# Patient Record
Sex: MALE | Race: WHITE | NOT HISPANIC OR LATINO | Employment: PART TIME | URBAN - METROPOLITAN AREA
[De-identification: names, ages, dates, MRNs, and addresses within clinical notes are randomized per-mention and may not be internally consistent; named-entity substitution may affect disease eponyms.]

---

## 2020-03-17 ENCOUNTER — TELEPHONE (OUTPATIENT)
Dept: FAMILY MEDICINE CLINIC | Facility: CLINIC | Age: 65
End: 2020-03-17

## 2020-03-27 ENCOUNTER — OFFICE VISIT (OUTPATIENT)
Dept: FAMILY MEDICINE CLINIC | Facility: CLINIC | Age: 65
End: 2020-03-27

## 2020-03-27 VITALS
WEIGHT: 260 LBS | BODY MASS INDEX: 34.46 KG/M2 | TEMPERATURE: 98 F | OXYGEN SATURATION: 96 % | DIASTOLIC BLOOD PRESSURE: 60 MMHG | HEART RATE: 67 BPM | SYSTOLIC BLOOD PRESSURE: 124 MMHG | HEIGHT: 73 IN

## 2020-03-27 DIAGNOSIS — I10 ESSENTIAL HYPERTENSION: ICD-10-CM

## 2020-03-27 DIAGNOSIS — E78.2 MIXED HYPERLIPIDEMIA: ICD-10-CM

## 2020-03-27 DIAGNOSIS — M79.10 MYALGIA: ICD-10-CM

## 2020-03-27 DIAGNOSIS — E11.9 TYPE 2 DIABETES MELLITUS WITHOUT COMPLICATION, WITHOUT LONG-TERM CURRENT USE OF INSULIN (HCC): Primary | ICD-10-CM

## 2020-03-27 DIAGNOSIS — M15.9 GENERALIZED OA: ICD-10-CM

## 2020-03-27 LAB
ALBUMIN SERPL-MCNC: 4.3 G/DL (ref 3.5–5.2)
ALBUMIN/GLOB SERPL: 1.7 G/DL
ALP SERPL-CCNC: 74 U/L (ref 39–117)
ALT SERPL-CCNC: 33 U/L (ref 1–41)
AST SERPL-CCNC: 24 U/L (ref 1–40)
BILIRUB SERPL-MCNC: 0.6 MG/DL (ref 0.2–1.2)
BUN SERPL-MCNC: 15 MG/DL (ref 8–23)
BUN/CREAT SERPL: 17.2 (ref 7–25)
CALCIUM SERPL-MCNC: 9.4 MG/DL (ref 8.6–10.5)
CHLORIDE SERPL-SCNC: 101 MMOL/L (ref 98–107)
CHOLEST SERPL-MCNC: 236 MG/DL (ref 0–200)
CK SERPL-CCNC: 361 U/L (ref 20–200)
CO2 SERPL-SCNC: 28.5 MMOL/L (ref 22–29)
CREAT SERPL-MCNC: 0.87 MG/DL (ref 0.76–1.27)
GLOBULIN SER CALC-MCNC: 2.6 GM/DL
GLUCOSE SERPL-MCNC: 108 MG/DL (ref 65–99)
HBA1C MFR BLD: 6.2 %
HDLC SERPL-MCNC: 45 MG/DL (ref 40–60)
LDLC SERPL CALC-MCNC: 166 MG/DL (ref 0–100)
LDLC/HDLC SERPL: 3.7 {RATIO}
POTASSIUM SERPL-SCNC: 3.9 MMOL/L (ref 3.5–5.2)
PROT SERPL-MCNC: 6.9 G/DL (ref 6–8.5)
SODIUM SERPL-SCNC: 140 MMOL/L (ref 136–145)
TRIGL SERPL-MCNC: 123 MG/DL (ref 0–150)
VLDLC SERPL CALC-MCNC: 24.6 MG/DL

## 2020-03-27 PROCEDURE — 83036 HEMOGLOBIN GLYCOSYLATED A1C: CPT | Performed by: FAMILY MEDICINE

## 2020-03-27 PROCEDURE — 99214 OFFICE O/P EST MOD 30 MIN: CPT | Performed by: FAMILY MEDICINE

## 2020-03-27 RX ORDER — OLMESARTAN MEDOXOMIL AND HYDROCHLOROTHIAZIDE 40/25 40; 25 MG/1; MG/1
1 TABLET ORAL DAILY
COMMUNITY
Start: 2020-03-02 | End: 2020-03-27 | Stop reason: SDUPTHER

## 2020-03-27 RX ORDER — OLMESARTAN MEDOXOMIL AND HYDROCHLOROTHIAZIDE 40/25 40; 25 MG/1; MG/1
1 TABLET ORAL DAILY
Qty: 90 TABLET | Refills: 3 | Status: SHIPPED | OUTPATIENT
Start: 2020-03-27 | End: 2020-12-04

## 2020-03-27 RX ORDER — EZETIMIBE 10 MG/1
10 TABLET ORAL DAILY
COMMUNITY
Start: 2020-02-20 | End: 2020-07-24

## 2020-03-27 RX ORDER — AMLODIPINE BESYLATE 5 MG/1
5 TABLET ORAL DAILY
Qty: 90 TABLET | Refills: 3 | Status: SHIPPED | OUTPATIENT
Start: 2020-03-27 | End: 2020-08-10 | Stop reason: SDUPTHER

## 2020-03-27 RX ORDER — MELOXICAM 15 MG/1
15 TABLET ORAL DAILY
COMMUNITY
Start: 2020-02-20 | End: 2022-02-16 | Stop reason: SDUPTHER

## 2020-03-27 RX ORDER — AMLODIPINE BESYLATE 5 MG/1
5 TABLET ORAL DAILY
COMMUNITY
Start: 2020-03-17 | End: 2020-03-27 | Stop reason: SDUPTHER

## 2020-03-27 NOTE — PATIENT INSTRUCTIONS
Recommend low fat/low calorie diet and exercise greater than 150 minutes of cardio per week.   Continue current treatment plan.  RESTART ZETIA every day

## 2020-03-27 NOTE — PROGRESS NOTES
Subjective   Simone Kyle is a 64 y.o. male.     Vitals:    03/27/20 0954   BP: 124/60   Pulse: 67   Temp: 98 °F (36.7 °C)   SpO2: 96%        Chief Complaint   Patient presents with   • Hyperlipidemia     NEW TO [facility] GET ESTABLISHED PATIENT IS FASTING   • Hypertension        History of Present Illness    3-month follow-up on hypertension, hyperlipidemia, myalgias, diabetes, and osteoarthritis    Last office visit with me at [facility] December 2019 for follow-up labs and refills.  At that time patient was having a significant amount of problems with myalgias of the lower extremities/muscle muscle pain.  His CK level was found to be elevated at a value of 235 thus we discontinued his Crestor and initiated Zetia.  His myalgias are much improved.  He states they are about 75% better.  Yet, for some reason he misunderstood and is only taking his Zetia 2-3 times per week.  Although, on a good note within the last few weeks he has significantly improved his lifestyle with healthier eating and increasing his cardio exercise to daily walks in the park greater than 30 minutes.  Subsequently, he has lost about 8 pounds in the last few weeks.  He presents today for follow-up labs and refills.    His hypertension has been well controlled with Norvasc and Benicar.  He does need a refill on both these medications today.    His osteoarthritis primarily involves his hands right greater than left.  He states when he wakes up in the morning they are stiff and achy.  But as the day progresses they improved.  He does take the meloxicam on an as-needed basis which does help.    The following portions of the patient's history were reviewed and updated as appropriate: allergies, current medications, past family history, past medical history, past social history, past surgical history and problem list.    Review of Systems   Constitutional: Negative for unexpected weight gain and unexpected weight loss.   Respiratory: Negative for shortness  of breath.    Cardiovascular: Negative for chest pain.   I have reviewed and confirmed the accuracy of the ROS as documented by the MA/LPN/RN Diana Bennett MD      Objective   Physical Exam   Constitutional: He appears well-developed and well-nourished.   HENT:   Head: Normocephalic and atraumatic.   Eyes: Pupils are equal, round, and reactive to light. Conjunctivae are normal.   Neck: Normal range of motion. Neck supple. No thyromegaly present.   Cardiovascular: Normal rate, regular rhythm and normal heart sounds.   Pulmonary/Chest: Effort normal and breath sounds normal.   Abdominal: Soft. Bowel sounds are normal. He exhibits no distension. There is no hepatosplenomegaly. There is no tenderness.   Musculoskeletal: He exhibits no edema.   Lymphadenopathy:     He has no cervical adenopathy.   Psychiatric: He has a normal mood and affect. His behavior is normal. Judgment and thought content normal.   Nursing note and vitals reviewed.       LABS/STUDIES --today's hemoglobin A1c 6.2    Procedures     Assessment/Plan   Simone was seen today for hyperlipidemia and hypertension.    Diagnoses and all orders for this visit:    Type 2 diabetes mellitus without complication, without long-term current use of insulin (CMS/[facility]) --diet-controlled, may continue to remain off medication.  Continue healthy lifestyle with low calorie/low carbohydrate diet and increase cardio exercise greater than 150 minutes weekly.  Will recheck hemoglobin A1c in 3 months  -     POC Glycosylated Hemoglobin (Hb A1C)    Myalgia --improved after discontinuation of statin treatment/Crestor.  Will recheck CK value today and remain off statin treatment.  -     CK    Mixed hyperlipidemia --uncontrolled, given diabetes history goal LDL less than 70 at least less than 100.  Thus, patient does need to take Zetia 10 mg 1 p.o. daily.  Will recheck lipids today  -     Lipid Panel With LDL / HDL Ratio    Essential hypertension --controlled, refill Norvasc 5 mg  1 p.o. daily and refill Benicar HCTZ 40/25 mg 1 p.o. daily  -     Comprehensive Metabolic Panel    Generalized OA --primarily hands, may continue meloxicam 15 mg 1 p.o. daily as needed, will recheck renal function today    Other orders  -     amLODIPine (NORVASC) 5 MG tablet; Take 1 tablet by mouth Daily.  -     olmesartan-hydrochlorothiazide (BENICAR HCT) 40-25 MG per tablet; Take 1 tablet by mouth Daily.                 Return in about 3 months (around 6/27/2020) for Annual physical.

## 2020-07-24 ENCOUNTER — RESULTS ENCOUNTER (OUTPATIENT)
Dept: FAMILY MEDICINE CLINIC | Facility: CLINIC | Age: 65
End: 2020-07-24

## 2020-07-24 ENCOUNTER — OFFICE VISIT (OUTPATIENT)
Dept: FAMILY MEDICINE CLINIC | Facility: CLINIC | Age: 65
End: 2020-07-24

## 2020-07-24 VITALS
HEIGHT: 73 IN | WEIGHT: 256.8 LBS | BODY MASS INDEX: 34.03 KG/M2 | DIASTOLIC BLOOD PRESSURE: 90 MMHG | OXYGEN SATURATION: 98 % | HEART RATE: 63 BPM | TEMPERATURE: 99.3 F | SYSTOLIC BLOOD PRESSURE: 150 MMHG

## 2020-07-24 DIAGNOSIS — I10 ESSENTIAL HYPERTENSION: ICD-10-CM

## 2020-07-24 DIAGNOSIS — E11.9 TYPE 2 DIABETES MELLITUS WITHOUT COMPLICATION, WITHOUT LONG-TERM CURRENT USE OF INSULIN (HCC): ICD-10-CM

## 2020-07-24 DIAGNOSIS — M15.9 GENERALIZED OA: ICD-10-CM

## 2020-07-24 DIAGNOSIS — Z86.19 HISTORY OF VIRAL ILLNESS: ICD-10-CM

## 2020-07-24 DIAGNOSIS — Z12.11 ENCOUNTER FOR SCREENING COLONOSCOPY: ICD-10-CM

## 2020-07-24 DIAGNOSIS — E78.2 MIXED HYPERLIPIDEMIA: ICD-10-CM

## 2020-07-24 DIAGNOSIS — Z00.00 WELLNESS EXAMINATION: Primary | ICD-10-CM

## 2020-07-24 DIAGNOSIS — M79.10 MYALGIA: ICD-10-CM

## 2020-07-24 DIAGNOSIS — R74.8 ELEVATED CK: ICD-10-CM

## 2020-07-24 LAB
DEVELOPER EXPIRATION DATE: NORMAL
DEVELOPER LOT NUMBER: NORMAL
EXPIRATION DATE: NORMAL
FECAL OCCULT BLOOD SCREEN, POC: NEGATIVE
Lab: NORMAL
NEGATIVE CONTROL: NEGATIVE
POSITIVE CONTROL: POSITIVE

## 2020-07-24 PROCEDURE — 99214 OFFICE O/P EST MOD 30 MIN: CPT | Performed by: FAMILY MEDICINE

## 2020-07-24 PROCEDURE — 82270 OCCULT BLOOD FECES: CPT | Performed by: FAMILY MEDICINE

## 2020-07-24 PROCEDURE — 99397 PER PM REEVAL EST PAT 65+ YR: CPT | Performed by: FAMILY MEDICINE

## 2020-07-24 RX ORDER — AMLODIPINE BESYLATE 10 MG/1
10 TABLET ORAL DAILY
Qty: 30 TABLET | Refills: 3 | Status: CANCELLED | OUTPATIENT
Start: 2020-07-24

## 2020-07-24 RX ORDER — OLMESARTAN MEDOXOMIL AND HYDROCHLOROTHIAZIDE 40/12.5 40; 12.5 MG/1; MG/1
1 TABLET ORAL DAILY
Qty: 90 TABLET | Refills: 1 | Status: SHIPPED | OUTPATIENT
Start: 2020-07-24 | End: 2021-03-07

## 2020-07-24 NOTE — PATIENT INSTRUCTIONS
Recommend low fat/low calorie diet and exercise greater than 150 minutes of cardio per week.   Continue current treatment plan.  Restart Norvasc 5 mg daily  Decrease Benicar to 40/12.5 mg daily

## 2020-07-24 NOTE — PROGRESS NOTES
Subjective   Simone Kyle is a 65 y.o. male.     Vitals:    07/24/20 0855   BP: 150/90   Pulse:    Temp:    SpO2:         Chief Complaint   Patient presents with   • Annual Exam     yearly exam 6 months follow up   • Diabetes     patient is fasting   • Hypertension   • Hyperlipidemia        History of Present Illness    Here for annual well man exam and 6-month follow-up for hypertension, hyperlipidemia, myalgias, abnormal labs and wants to be tested for COVID-19    Last office visit to get established, medication refills, and for persistent myalgias  At that visit, Zetia was stopped due to an elevated CK enzyme at 361.  The previous visit back in October 2019 his Crestor was stopped due to myalgias.  He states since stopping both the statin and Zetia his myalgias are somewhat improved.  He still has achiness however attributes this to more arthritis?    Hypertension was previously well controlled with Benicar 40/25 mg daily and amlodipine 5 mg daily.  However, in recent months patient started having some dizziness and low blood pressures thus he decreased his amlodipine from 5 mg to 2.5 mg daily.  Dizziness has resolved.  Yet, blood pressures are now elevated again.  Denies chest pain, shortness of air.    Osteoarthritis has been well controlled with as needed meloxicam.  Tolerates medication without side effects.  Only takes this on his golfing days.    States he had a bad viral illness early in 2020.  Would like to be checked for COVID-19 antibodies    Routine health maintenance/screening test:  Last colonoscopy 2006, normal  No ophthalmology exam in recent years  Tetanus greater than 10 years ago  No Zostrix vaccine  Family history negative for colon cancer, premature coronary artery disease  Non-smoker, social alcohol only  Tries to maintain a healthy well-balanced diet yet admits to some fast food more than he should.  Mild to moderate exercise activity, daily walks with dog in the park.    The following portions  of the patient's history were reviewed and updated as appropriate: allergies, current medications, past family history, past medical history, past social history, past surgical history and problem list.    Review of Systems   Constitutional: Negative for fever, unexpected weight gain and unexpected weight loss.   Respiratory: Negative for shortness of breath.    Cardiovascular: Negative for chest pain.   Musculoskeletal: Positive for arthralgias and myalgias.       Objective   Physical Exam   Constitutional: He is oriented to person, place, and time. He appears well-developed and well-nourished.   HENT:   Head: Normocephalic and atraumatic.   Right Ear: External ear normal.   Left Ear: External ear normal.   Nose: Nose normal.   Mouth/Throat: Oropharynx is clear and moist.   Eyes: Pupils are equal, round, and reactive to light. Conjunctivae and EOM are normal.   Neck: Normal range of motion. No thyromegaly present.   Cardiovascular: Normal rate, regular rhythm, normal heart sounds and intact distal pulses.   Pulmonary/Chest: Effort normal and breath sounds normal.   Abdominal: Soft. Bowel sounds are normal. He exhibits no distension. There is no hepatosplenomegaly. There is no tenderness. Hernia confirmed negative in the right inguinal area and confirmed negative in the left inguinal area.   Genitourinary: Rectum normal, prostate normal and penis normal. Rectal exam shows guaiac negative stool.   Musculoskeletal: Normal range of motion. He exhibits no edema.   Lymphadenopathy:     He has no cervical adenopathy.   Neurological: He is alert and oriented to person, place, and time.   Skin: Skin is warm and dry. Capillary refill takes less than 2 seconds.   Psychiatric: He has a normal mood and affect. His behavior is normal. Judgment and thought content normal.   Nursing note and vitals reviewed.       LABS/STUDIES --December 2019 PSA normal                                 March 2020 --, , otherwise normal  CMP    Procedures     Assessment/Plan   Simone was seen today for annual exam, diabetes, hypertension and hyperlipidemia.    Diagnoses and all orders for this visit:    Wellness examination within normal limits.  Further screening to be obtained today includes CBC, hepatitis C antibody, and Cologuard.  Declines any further vaccinations at this time.  Recommend ophthalmology exam.  Otherwise, needs to improve upon a healthy well-balanced diet, decrease fast food, and continue to maintain regular cardio exercise greater than 150 minutes weekly  -     CBC & Differential  -     Hepatitis C Antibody  -     POCT occult blood x 1 stool    Elevated CK uncontrolled/symptomatic yet improved.  Will recheck CK enzyme today given off all statins and Zetia greater than 3 months.  Hopefully back to normal if not may need referral for muscle biopsy?    Myalgia improved given all statins and Zetia.  Recheck CK today.  -     CK    Essential hypertension uncontrolled.  Recommend that he restart Norvasc 5 mg daily.  May decrease Benicar HCTZ from 40/25 mg to 40/12.5 mg daily.  Also, recommend a baby aspirin 81 mg daily.  -     Comprehensive Metabolic Panel    Mixed hyperlipidemia most likely uncontrolled given intolerant to statins and Zetia.  Will recheck lipids today.  In the future may initiate gemfibrozil?  -     Comprehensive Metabolic Panel  -     Lipid Panel With LDL / HDL Ratio    Type 2 diabetes mellitus without complication, without long-term current use of insulin (CMS/[facility]) diet controlled.  Recheck glucose today, fasting    Generalized OA stable with PRN meloxicam    History of viral illness per patient request check COVID-19 antibodies   -     SARS-CoV-2 Antibodies (Roche); Future    Encounter for screening colonoscopy low risk, order Cologuard  -     Cologuard - Stool, Per Rectum; Future    Other orders  -     olmesartan-hydrochlorothiazide (Benicar HCT) 40-12.5 MG per tablet; Take 1 tablet by mouth Daily.                  Return in about 3 months (around 10/24/2020) for Recheck.

## 2020-07-25 LAB
ALBUMIN SERPL-MCNC: 4.4 G/DL (ref 3.5–5.2)
ALBUMIN/GLOB SERPL: 1.9 G/DL
ALP SERPL-CCNC: 61 U/L (ref 39–117)
ALT SERPL-CCNC: 30 U/L (ref 1–41)
AST SERPL-CCNC: 24 U/L (ref 1–40)
BASOPHILS # BLD AUTO: 0.03 10*3/MM3 (ref 0–0.2)
BASOPHILS NFR BLD AUTO: 0.8 % (ref 0–1.5)
BILIRUB SERPL-MCNC: 0.6 MG/DL (ref 0–1.2)
BUN SERPL-MCNC: 17 MG/DL (ref 8–23)
BUN/CREAT SERPL: 17 (ref 7–25)
CALCIUM SERPL-MCNC: 9.4 MG/DL (ref 8.6–10.5)
CHLORIDE SERPL-SCNC: 102 MMOL/L (ref 98–107)
CHOLEST SERPL-MCNC: 258 MG/DL (ref 0–200)
CK SERPL-CCNC: 319 U/L (ref 20–200)
CO2 SERPL-SCNC: 30.2 MMOL/L (ref 22–29)
CREAT SERPL-MCNC: 1 MG/DL (ref 0.76–1.27)
EOSINOPHIL # BLD AUTO: 0.14 10*3/MM3 (ref 0–0.4)
EOSINOPHIL NFR BLD AUTO: 3.5 % (ref 0.3–6.2)
ERYTHROCYTE [DISTWIDTH] IN BLOOD BY AUTOMATED COUNT: 13.3 % (ref 12.3–15.4)
GLOBULIN SER CALC-MCNC: 2.3 GM/DL
GLUCOSE SERPL-MCNC: 110 MG/DL (ref 65–99)
HCT VFR BLD AUTO: 45.7 % (ref 37.5–51)
HCV AB S/CO SERPL IA: <0.1 S/CO RATIO (ref 0–0.9)
HDLC SERPL-MCNC: 54 MG/DL (ref 40–60)
HGB BLD-MCNC: 15.4 G/DL (ref 13–17.7)
IMM GRANULOCYTES # BLD AUTO: 0.01 10*3/MM3 (ref 0–0.05)
IMM GRANULOCYTES NFR BLD AUTO: 0.3 % (ref 0–0.5)
LDLC SERPL CALC-MCNC: 166 MG/DL (ref 0–100)
LDLC/HDLC SERPL: 3.08 {RATIO}
LYMPHOCYTES # BLD AUTO: 0.88 10*3/MM3 (ref 0.7–3.1)
LYMPHOCYTES NFR BLD AUTO: 22.2 % (ref 19.6–45.3)
MCH RBC QN AUTO: 30.7 PG (ref 26.6–33)
MCHC RBC AUTO-ENTMCNC: 33.7 G/DL (ref 31.5–35.7)
MCV RBC AUTO: 91 FL (ref 79–97)
MONOCYTES # BLD AUTO: 0.34 10*3/MM3 (ref 0.1–0.9)
MONOCYTES NFR BLD AUTO: 8.6 % (ref 5–12)
NEUTROPHILS # BLD AUTO: 2.57 10*3/MM3 (ref 1.7–7)
NEUTROPHILS NFR BLD AUTO: 64.6 % (ref 42.7–76)
NRBC BLD AUTO-RTO: 0 /100 WBC (ref 0–0.2)
PLATELET # BLD AUTO: 147 10*3/MM3 (ref 140–450)
POTASSIUM SERPL-SCNC: 4.2 MMOL/L (ref 3.5–5.2)
PROT SERPL-MCNC: 6.7 G/DL (ref 6–8.5)
RBC # BLD AUTO: 5.02 10*6/MM3 (ref 4.14–5.8)
SODIUM SERPL-SCNC: 141 MMOL/L (ref 136–145)
TRIGL SERPL-MCNC: 188 MG/DL (ref 0–150)
VLDLC SERPL CALC-MCNC: 37.6 MG/DL
WBC # BLD AUTO: 3.97 10*3/MM3 (ref 3.4–10.8)

## 2020-08-07 ENCOUNTER — RESULTS ENCOUNTER (OUTPATIENT)
Dept: FAMILY MEDICINE CLINIC | Facility: CLINIC | Age: 65
End: 2020-08-07

## 2020-08-07 DIAGNOSIS — Z86.19 HISTORY OF VIRAL ILLNESS: ICD-10-CM

## 2020-08-10 RX ORDER — AMLODIPINE BESYLATE 5 MG/1
5 TABLET ORAL DAILY
Qty: 90 TABLET | Refills: 0 | Status: SHIPPED | OUTPATIENT
Start: 2020-08-10 | End: 2020-11-13

## 2020-08-28 ENCOUNTER — OFFICE VISIT (OUTPATIENT)
Dept: FAMILY MEDICINE CLINIC | Facility: CLINIC | Age: 65
End: 2020-08-28

## 2020-08-28 VITALS
DIASTOLIC BLOOD PRESSURE: 90 MMHG | BODY MASS INDEX: 33.27 KG/M2 | OXYGEN SATURATION: 98 % | WEIGHT: 251 LBS | HEART RATE: 66 BPM | HEIGHT: 73 IN | TEMPERATURE: 98.4 F | SYSTOLIC BLOOD PRESSURE: 148 MMHG

## 2020-08-28 DIAGNOSIS — M15.9 GENERALIZED OA: ICD-10-CM

## 2020-08-28 DIAGNOSIS — M79.10 MYALGIA: Primary | ICD-10-CM

## 2020-08-28 DIAGNOSIS — I10 ESSENTIAL HYPERTENSION: ICD-10-CM

## 2020-08-28 DIAGNOSIS — E78.2 MIXED HYPERLIPIDEMIA: ICD-10-CM

## 2020-08-28 DIAGNOSIS — R42 LIGHTHEADED: ICD-10-CM

## 2020-08-28 DIAGNOSIS — R74.8 ELEVATED CK: ICD-10-CM

## 2020-08-28 PROBLEM — Z00.00 WELLNESS EXAMINATION: Status: RESOLVED | Noted: 2020-07-24 | Resolved: 2020-08-28

## 2020-08-28 PROBLEM — Z12.11 ENCOUNTER FOR SCREENING COLONOSCOPY: Status: RESOLVED | Noted: 2020-07-24 | Resolved: 2020-08-28

## 2020-08-28 PROCEDURE — 99214 OFFICE O/P EST MOD 30 MIN: CPT | Performed by: FAMILY MEDICINE

## 2020-08-28 NOTE — PROGRESS NOTES
"Subjective   Simone Kyle is a 65 y.o. male.     Vitals:    08/28/20 0813   BP: 148/90   Pulse: 66   Temp: 98.4 °F (36.9 °C)   SpO2: 98%        Chief Complaint   Patient presents with   • Diabetes     1 month follow up on abnormal labs?   • myalgia   • Hypertension     Also with complaints of lightheadedness   • Hyperlipidemia        History of Present Illness    1 month follow-up for myalgias, hyperlipidemia, hypertension, and abnormal labs    LOV with me 4 weeks ago for persistent myalgias, elevated CK enzymes, and uncontrolled hypertension.    Myalgias have been present for approximately 6 months, ever since initiating statin treatment in late 2019.  Patient was also found to have elevated CK enzymes at that time.  Thus, he was asked to discontinue all statin and Zetia treatment in early 2020.  His myalgias are slowly improving.  Although he still has some generalized myalgias in his lower extremities along with elevated CK (improving) he believes much of his achiness is located in his joints and may be related to arthritis.  At the present time he is taken meloxicam only as needed for osteoarthritis.    Also, last office visit patient was asked to increase his Norvasc to 5 mg daily due to uncontrolled hypertension.  However, apparently he has had a few episodes of lightheadedness, 1 time where things \"almost got black\", no actual loss of consciousness thus he decided to decrease his Benicar to half a pill per day. .  Denies any chest pain, shortness of air, headache.    Hyperlipidemia had been well controlled on statin treatment, then subsequently Zetia.  However both these medications were stopped due to myalgias and elevated CK enzyme.  Statin was stopped over 6 months ago and Zetia was only stopped at last office visit/approximately 4 weeks ago.    The following portions of the patient's history were reviewed and updated as appropriate: allergies, current medications, past family history, past medical history, " past social history, past surgical history and problem list.    Review of Systems   Constitutional: Negative for unexpected weight gain and unexpected weight loss.   Respiratory: Negative for cough and shortness of breath.    Cardiovascular: Negative for chest pain and palpitations.   Neurological: Positive for light-headedness. Negative for seizures, syncope, weakness and headache.       Objective   Physical Exam   Constitutional: He appears well-developed.   HENT:   Head: Normocephalic and atraumatic.   Eyes: Pupils are equal, round, and reactive to light. Conjunctivae are normal.   Neck: Normal range of motion. Neck supple. No thyromegaly present.   Cardiovascular: Normal rate, regular rhythm and normal heart sounds.   No murmur heard.  Pulmonary/Chest: Effort normal and breath sounds normal.   Abdominal: Soft. Bowel sounds are normal. He exhibits no distension. There is no hepatosplenomegaly. There is no tenderness.   Musculoskeletal: He exhibits no edema.   Lymphadenopathy:     He has no cervical adenopathy.   Psychiatric: He has a normal mood and affect. His behavior is normal. Judgment and thought content normal.   Nursing note and vitals reviewed.       LABS/STUDIES --July 24, 2020  (March ), glucose 110, triglycerides 88,     Procedures     Assessment/Plan   Simone was seen today for diabetes, myalgia, hypertension and hyperlipidemia.    Diagnoses and all orders for this visit:    Myalgia -still uncontrolled.  Suspect secondary to statin and/or Zetia.  Both medications have been discontinued (statin greater than 6 months, Zetia approximately 4 weeks) we will recheck CK today and other rheumatologic labs listed below.  If not continuing to improve may need referral for muscle biopsy?  -     NASIM  -     CK  -     Sedimentation Rate  -     C-reactive Protein  -     CK; Future  -     Rheumatoid Factor    Elevated CK --uncontrolled.  Given myalgias and persistent elevated CK, most likely due to  statin treatment and Zetia treatment.  Both agents have been discontinued.  Recheck CK today and labs listed below.  -     NASIM  -     CK  -     Sedimentation Rate  -     C-reactive Protein  -     CK; Future  -     Rheumatoid Factor    Lightheaded -new diagnosis.  Suspect may be secondary to uncontrolled hypertension?  Yet given myalgias will check further lab work listed below.  If not better follow-up sooner for further work-up.  -     NASIM  -     CK  -     Sedimentation Rate  -     C-reactive Protein  -     CK; Future  -     CBC & Differential    Essential hypertension uncontrolled.  Increase Benicar/HCTZ 40/12.5 mg back to 1 whole pill per day (not 1/2 pill).  Continue Norvasc at 5 mg daily  -     Comprehensive Metabolic Panel; Future    Mixed hyperlipidemia uncontrolled, most likely due to off statin and Zetia treatment.  Patient is working hard to improve lifestyle with better diet and exercise.  Will recheck lipids in approximately 2 to 3 months.  -     Hemoglobin A1c; Future  -     Lipid Panel With LDL / HDL Ratio; Future    Generalized OA uncontrolled.  At this point may continue meloxicam PRN.  Will check sed rate, rheumatoid factor normal ligament  -     Sedimentation Rate    Other orders  -     DNA / DS                 Return in about 3 months (around 11/28/2020).

## 2020-08-29 LAB
ANA SER QL: POSITIVE
BASOPHILS # BLD AUTO: 0.02 10*3/MM3 (ref 0–0.2)
BASOPHILS NFR BLD AUTO: 0.5 % (ref 0–1.5)
CK SERPL-CCNC: 266 U/L (ref 20–200)
CRP SERPL-MCNC: 0.04 MG/DL (ref 0–0.5)
DSDNA AB SER-ACNC: <1 IU/ML (ref 0–9)
EOSINOPHIL # BLD AUTO: 0.12 10*3/MM3 (ref 0–0.4)
EOSINOPHIL NFR BLD AUTO: 2.7 % (ref 0.3–6.2)
ERYTHROCYTE [DISTWIDTH] IN BLOOD BY AUTOMATED COUNT: 13.1 % (ref 12.3–15.4)
ERYTHROCYTE [SEDIMENTATION RATE] IN BLOOD BY WESTERGREN METHOD: 2 MM/HR (ref 0–20)
HCT VFR BLD AUTO: 42.8 % (ref 37.5–51)
HGB BLD-MCNC: 15 G/DL (ref 13–17.7)
IMM GRANULOCYTES # BLD AUTO: 0.01 10*3/MM3 (ref 0–0.05)
IMM GRANULOCYTES NFR BLD AUTO: 0.2 % (ref 0–0.5)
LYMPHOCYTES # BLD AUTO: 0.81 10*3/MM3 (ref 0.7–3.1)
LYMPHOCYTES NFR BLD AUTO: 18.5 % (ref 19.6–45.3)
Lab: NORMAL
MCH RBC QN AUTO: 31.3 PG (ref 26.6–33)
MCHC RBC AUTO-ENTMCNC: 35 G/DL (ref 31.5–35.7)
MCV RBC AUTO: 89.4 FL (ref 79–97)
MONOCYTES # BLD AUTO: 0.4 10*3/MM3 (ref 0.1–0.9)
MONOCYTES NFR BLD AUTO: 9.1 % (ref 5–12)
NEUTROPHILS # BLD AUTO: 3.02 10*3/MM3 (ref 1.7–7)
NEUTROPHILS NFR BLD AUTO: 69 % (ref 42.7–76)
NRBC BLD AUTO-RTO: 0 /100 WBC (ref 0–0.2)
PLATELET # BLD AUTO: 155 10*3/MM3 (ref 140–450)
RBC # BLD AUTO: 4.79 10*6/MM3 (ref 4.14–5.8)
RHEUMATOID FACT SERPL-ACNC: 10.9 IU/ML (ref 0–13.9)
WBC # BLD AUTO: 4.38 10*3/MM3 (ref 3.4–10.8)

## 2020-08-29 NOTE — PATIENT INSTRUCTIONS
Restart Benicar at 1 whole pill per day  Recommend low fat/low calorie diet and exercise greater than 150 minutes of cardio per week.   Continue current treatment plan.  If not better follow-up sooner than next regular appointment.

## 2020-09-08 DIAGNOSIS — R76.8 POSITIVE ANA (ANTINUCLEAR ANTIBODY): ICD-10-CM

## 2020-09-08 DIAGNOSIS — M79.10 MYALGIA: Primary | ICD-10-CM

## 2020-09-08 DIAGNOSIS — R74.8 ELEVATED CK: ICD-10-CM

## 2020-11-13 RX ORDER — AMLODIPINE BESYLATE 5 MG/1
5 TABLET ORAL DAILY
Qty: 30 TABLET | Refills: 0 | Status: SHIPPED | OUTPATIENT
Start: 2020-11-13 | End: 2020-11-17

## 2020-11-17 RX ORDER — AMLODIPINE BESYLATE 5 MG/1
5 TABLET ORAL DAILY
Qty: 30 TABLET | Refills: 0 | Status: SHIPPED | OUTPATIENT
Start: 2020-11-17 | End: 2020-12-04 | Stop reason: SDUPTHER

## 2020-11-25 ENCOUNTER — TELEPHONE (OUTPATIENT)
Dept: FAMILY MEDICINE CLINIC | Facility: CLINIC | Age: 65
End: 2020-11-25

## 2020-11-25 DIAGNOSIS — E78.2 MIXED HYPERLIPIDEMIA: Primary | ICD-10-CM

## 2020-11-25 DIAGNOSIS — E11.9 TYPE 2 DIABETES MELLITUS WITHOUT COMPLICATION, WITHOUT LONG-TERM CURRENT USE OF INSULIN (HCC): ICD-10-CM

## 2020-11-25 DIAGNOSIS — I10 ESSENTIAL HYPERTENSION: ICD-10-CM

## 2020-11-25 NOTE — TELEPHONE ENCOUNTER
PATIENT WOULD LIKE TO KNOW IF HE NEEDS LABS BEFORE HIS APPT ON 12/4. HE WANTS TO KNOW WHEN AND WHERE HE CAN GET THOSE DONE.    PLEASE ADVISE  [phone]

## 2020-11-26 ENCOUNTER — RESULTS ENCOUNTER (OUTPATIENT)
Dept: FAMILY MEDICINE CLINIC | Facility: CLINIC | Age: 65
End: 2020-11-26

## 2020-11-26 DIAGNOSIS — E78.2 MIXED HYPERLIPIDEMIA: ICD-10-CM

## 2020-11-26 DIAGNOSIS — I10 ESSENTIAL HYPERTENSION: ICD-10-CM

## 2020-11-26 DIAGNOSIS — R42 LIGHTHEADED: ICD-10-CM

## 2020-11-26 DIAGNOSIS — M79.10 MYALGIA: ICD-10-CM

## 2020-11-26 DIAGNOSIS — R74.8 ELEVATED CK: ICD-10-CM

## 2020-11-30 LAB
ALBUMIN SERPL-MCNC: 4.4 G/DL (ref 3.5–5.2)
ALBUMIN/GLOB SERPL: 2 G/DL
ALP SERPL-CCNC: 83 U/L (ref 39–117)
ALT SERPL-CCNC: 33 U/L (ref 1–41)
AST SERPL-CCNC: 25 U/L (ref 1–40)
BILIRUB SERPL-MCNC: 0.5 MG/DL (ref 0–1.2)
BUN SERPL-MCNC: 25 MG/DL (ref 8–23)
BUN/CREAT SERPL: 22.9 (ref 7–25)
CALCIUM SERPL-MCNC: 9.6 MG/DL (ref 8.6–10.5)
CHLORIDE SERPL-SCNC: 101 MMOL/L (ref 98–107)
CHOLEST SERPL-MCNC: 273 MG/DL (ref 0–200)
CHOLEST/HDLC SERPL: 5.35 {RATIO}
CK SERPL-CCNC: 240 U/L (ref 20–200)
CO2 SERPL-SCNC: 28.7 MMOL/L (ref 22–29)
CREAT SERPL-MCNC: 1.09 MG/DL (ref 0.76–1.27)
GLOBULIN SER CALC-MCNC: 2.2 GM/DL
GLUCOSE SERPL-MCNC: 96 MG/DL (ref 65–99)
HBA1C MFR BLD: 5.8 % (ref 4.8–5.6)
HDLC SERPL-MCNC: 51 MG/DL (ref 40–60)
LDLC SERPL CALC-MCNC: 206 MG/DL (ref 0–100)
POTASSIUM SERPL-SCNC: 3.9 MMOL/L (ref 3.5–5.2)
PROT SERPL-MCNC: 6.6 G/DL (ref 6–8.5)
SODIUM SERPL-SCNC: 140 MMOL/L (ref 136–145)
TRIGL SERPL-MCNC: 95 MG/DL (ref 0–150)
VLDLC SERPL CALC-MCNC: 16 MG/DL (ref 5–40)

## 2020-12-04 ENCOUNTER — OFFICE VISIT (OUTPATIENT)
Dept: FAMILY MEDICINE CLINIC | Facility: CLINIC | Age: 65
End: 2020-12-04

## 2020-12-04 VITALS
TEMPERATURE: 96.9 F | DIASTOLIC BLOOD PRESSURE: 74 MMHG | SYSTOLIC BLOOD PRESSURE: 142 MMHG | HEART RATE: 64 BPM | BODY MASS INDEX: 33.24 KG/M2 | HEIGHT: 73 IN | OXYGEN SATURATION: 98 % | WEIGHT: 250.8 LBS

## 2020-12-04 DIAGNOSIS — E78.2 MIXED HYPERLIPIDEMIA: ICD-10-CM

## 2020-12-04 DIAGNOSIS — M79.10 MYALGIA: ICD-10-CM

## 2020-12-04 DIAGNOSIS — Z20.822 ENCOUNTER FOR LABORATORY TESTING FOR COVID-19 VIRUS: ICD-10-CM

## 2020-12-04 DIAGNOSIS — R74.8 ELEVATED CK: ICD-10-CM

## 2020-12-04 DIAGNOSIS — I10 ESSENTIAL HYPERTENSION: Primary | ICD-10-CM

## 2020-12-04 DIAGNOSIS — E11.9 TYPE 2 DIABETES MELLITUS WITHOUT COMPLICATION, WITHOUT LONG-TERM CURRENT USE OF INSULIN (HCC): ICD-10-CM

## 2020-12-04 PROCEDURE — 99214 OFFICE O/P EST MOD 30 MIN: CPT | Performed by: FAMILY MEDICINE

## 2020-12-04 RX ORDER — COLESEVELAM 180 1/1
1875 TABLET ORAL 2 TIMES DAILY WITH MEALS
Qty: 180 TABLET | Refills: 2 | Status: SHIPPED | OUTPATIENT
Start: 2020-12-04 | End: 2022-02-16

## 2020-12-04 RX ORDER — AMLODIPINE BESYLATE 10 MG/1
10 TABLET ORAL DAILY
Qty: 90 TABLET | Refills: 1 | Status: SHIPPED | OUTPATIENT
Start: 2020-12-04 | End: 2021-05-14 | Stop reason: SDUPTHER

## 2020-12-04 RX ORDER — GEMFIBROZIL 600 MG/1
600 TABLET, FILM COATED ORAL
Qty: 60 TABLET | Refills: 2 | Status: SHIPPED | OUTPATIENT
Start: 2020-12-04 | End: 2020-12-04 | Stop reason: SDUPTHER

## 2020-12-04 RX ORDER — AMOXICILLIN 500 MG/1
CAPSULE ORAL
COMMUNITY
Start: 2020-11-27 | End: 2022-02-16

## 2020-12-04 NOTE — PROGRESS NOTES
Subjective   Simone Kyle is a 65 y.o. male.     Vitals:    12/04/20 1254   BP: 142/74   Pulse: 64   Temp: 96.9 °F (36.1 °C)   SpO2: 98%        Chief Complaint   Patient presents with   • Diabetes     Patient is here to f/u on lab results he just had them drawn 4 days ago ( wore mask and goggles)    • Hyperlipidemia     f/u myalgias   • Hypertension     wants covid Ab testing        History of Present Illness    3 month F/U on HTN, Hyperlipidemia, DM, myalgias/CK levels, and desires COVId AB screening     LOV with me for uncontrolled HTN, new onset DM, and persistent myalgias/elevated CK. See changes made.  Overall, doing a little better in terms of the above.     Myalgias/pesistently elevated CK and positive NASIM---after the labs were drawn after our last visit and still with persistent elevation of CK (266), a positive NASIM, and myalgias (despite being off all statins and zetia > 6months) a referral was made to Rheumatology.  However, has yet to see them due to them cancelling once and him cancelling once due to covid screening concerns.    Still with myalgias although little better, and fatigue.  Recently had another set of labs last week and here for review    HTN---uncontrolled at last visit. Thus, Norvasc was increased from 2.5mg to 5mg a day. Feels better and home blood pressures are improved, but still gets 150s -160s often. Diastolics are ok. Denies CP, SOA, or any more H/A,lightheadedness. No changes made to Benicar.     Hyperlipidemia---remains uncontrolled despite improvements in diet and ex. Yet, off all cholesterol meds for greater than 6 months. Had fasting labs last month and here for review today.     DM--  New onset DM at LOV yet diet controlled. Has been trying to maintain a low carb and low chol diet and ex.     No known Covid exposure or symptoms just desires to be tested for the antibodies to see if had asymptomatic infection    The following portions of the patient's history were reviewed and  updated as appropriate: allergies, current medications, past family history, past medical history, past social history, past surgical history and problem list.    Review of Systems   Constitutional: Negative for fever, unexpected weight gain and unexpected weight loss.   Respiratory: Negative for shortness of breath.    Cardiovascular: Negative for chest pain.   Musculoskeletal: Positive for myalgias.   Neurological: Negative for dizziness, light-headedness and headache.       Objective   Physical Exam  Vitals signs and nursing note reviewed.   Constitutional:       Appearance: Normal appearance. He is well-developed. He is obese.   HENT:      Head: Normocephalic and atraumatic.      Nose: Nose normal.   Eyes:      Conjunctiva/sclera: Conjunctivae normal.      Pupils: Pupils are equal, round, and reactive to light.   Neck:      Musculoskeletal: Normal range of motion and neck supple.      Thyroid: No thyromegaly.   Cardiovascular:      Rate and Rhythm: Normal rate and regular rhythm.      Heart sounds: Normal heart sounds. No murmur.   Pulmonary:      Effort: Pulmonary effort is normal.      Breath sounds: Normal breath sounds.   Abdominal:      General: Abdomen is flat. Bowel sounds are normal. There is no distension.      Palpations: Abdomen is soft. There is no hepatomegaly, splenomegaly or mass.      Tenderness: There is no abdominal tenderness. There is no guarding or rebound.      Hernia: No hernia is present.   Musculoskeletal: Normal range of motion.      Right lower leg: No edema.      Left lower leg: No edema.   Lymphadenopathy:      Cervical: No cervical adenopathy.   Skin:     General: Skin is warm.   Neurological:      General: No focal deficit present.      Mental Status: He is alert.   Psychiatric:         Mood and Affect: Mood normal.         Behavior: Behavior normal.         Thought Content: Thought content normal.         Judgment: Judgment normal.          LABS/STUDIES --Nov 2020--A1C 5.8,   (266), , NASIM pos, ESR, RF, and CMP normal    Procedures     Assessment/Plan   Diagnoses and all orders for this visit:    1. Type 2 diabetes mellitus without complication, without long-term current use of insulin (CMS/[facility]) (Primary) --controlled with diet and ex only, cont with plan. Recheck A1c in 3 months  -     Hemoglobin A1c; Future  -     Hemoglobin A1c    2. Mixed hyperlipidemia --uncontrolled due to no meds for > 6months due to statin and zetia intol  Start Welchol as directed below.  May need to consider Repatha??  Otherwise, continue to improve upon healthy well-balanced diet that includes fruits, vegetables, and increase cardio exercise to greater than 150 minutes weekly.  Recheck lipids and CMP in 3 months prior to appt  -     Lipid Panel With LDL / HDL Ratio; Future  -     Comprehensive Metabolic Panel; Future  -     Comprehensive Metabolic Panel  -     Lipid Panel With LDL / HDL Ratio    3. Essential hypertension --uncontrolled.   Increase Norvasc to 10mg a day.  Cont Benicar as prescribed, will refill upon request  -     Comprehensive Metabolic Panel; Future  -     Comprehensive Metabolic Panel    4. Myalgia --uncontrolled. Still present and with persistent elevation of CK (off statins and zetia ) and positive NASIM so referred to Rheum, keep upcoming appt    5. Elevated CK --still uncontrolled but improving. Keep appt with Rheum  Cont to monitor CK, recheck in 3 months  -     CK; Future  -     CK    6. Encounter for laboratory testing for COVID-19 virus --[facility] yet desires to be checked for past infection, order placed.  -     SARS-CoV-2 Antibodies (Roche); Future  -     SARS-CoV-2 Antibodies (Roche)    Other orders  -     Discontinue: gemfibrozil (Lopid) 600 MG tablet; Take 1 tablet by mouth 2 (Two) Times a Day Before Meals.  Dispense: 60 tablet; Refill: 2  -     amLODIPine (NORVASC) 10 MG tablet; Take 1 tablet by mouth Daily.  Dispense: 90 tablet; Refill: 1  -     colesevelam (Welchol)  625 MG tablet; Take 3 tablets by mouth 2 (Two) Times a Day With Meals.  Dispense: 180 tablet; Refill: 2                 Wore goggles and face mask during entire visit.    Return in about 3 months (around 3/4/2021).

## 2020-12-05 NOTE — PATIENT INSTRUCTIONS
Increase norvasc to10mg a day  Start WElchol  Keep appt with Rheum  Recommend low fat/low calorie diet and exercise greater than 150 minutes of cardio per week.   Continue current treatment plan.

## 2021-01-04 ENCOUNTER — IMMUNIZATION (OUTPATIENT)
Dept: VACCINE CLINIC | Facility: HOSPITAL | Age: 66
End: 2021-01-04

## 2021-01-04 PROCEDURE — 0001A: CPT | Performed by: INTERNAL MEDICINE

## 2021-01-04 PROCEDURE — 91300 HC SARSCOV02 VAC 30MCG/0.3ML IM: CPT | Performed by: INTERNAL MEDICINE

## 2021-01-25 ENCOUNTER — IMMUNIZATION (OUTPATIENT)
Dept: VACCINE CLINIC | Facility: HOSPITAL | Age: 66
End: 2021-01-25

## 2021-01-25 PROCEDURE — 91300 HC SARSCOV02 VAC 30MCG/0.3ML IM: CPT | Performed by: INTERNAL MEDICINE

## 2021-01-25 PROCEDURE — 0002A: CPT | Performed by: INTERNAL MEDICINE

## 2021-03-07 RX ORDER — OLMESARTAN MEDOXOMIL AND HYDROCHLOROTHIAZIDE 40/12.5 40; 12.5 MG/1; MG/1
1 TABLET ORAL DAILY
Qty: 30 TABLET | Refills: 1 | Status: SHIPPED | OUTPATIENT
Start: 2021-03-07 | End: 2021-03-11

## 2021-03-11 RX ORDER — OLMESARTAN MEDOXOMIL AND HYDROCHLOROTHIAZIDE 40/12.5 40; 12.5 MG/1; MG/1
1 TABLET ORAL DAILY
Qty: 90 TABLET | Refills: 0 | Status: SHIPPED | OUTPATIENT
Start: 2021-03-11 | End: 2021-04-09

## 2021-04-09 RX ORDER — OLMESARTAN MEDOXOMIL AND HYDROCHLOROTHIAZIDE 40/12.5 40; 12.5 MG/1; MG/1
1 TABLET ORAL DAILY
Qty: 30 TABLET | Refills: 0 | Status: SHIPPED | OUTPATIENT
Start: 2021-04-09 | End: 2021-04-15

## 2021-04-15 RX ORDER — OLMESARTAN MEDOXOMIL AND HYDROCHLOROTHIAZIDE 40/12.5 40; 12.5 MG/1; MG/1
1 TABLET ORAL DAILY
Qty: 30 TABLET | Refills: 0 | Status: SHIPPED | OUTPATIENT
Start: 2021-04-15 | End: 2021-04-21

## 2021-04-21 RX ORDER — OLMESARTAN MEDOXOMIL AND HYDROCHLOROTHIAZIDE 40/12.5 40; 12.5 MG/1; MG/1
1 TABLET ORAL DAILY
Qty: 30 TABLET | Refills: 0 | Status: SHIPPED | OUTPATIENT
Start: 2021-04-21 | End: 2021-05-08

## 2021-05-06 DIAGNOSIS — I10 ESSENTIAL HYPERTENSION: Primary | ICD-10-CM

## 2021-05-06 DIAGNOSIS — E11.9 TYPE 2 DIABETES MELLITUS WITHOUT COMPLICATION, WITHOUT LONG-TERM CURRENT USE OF INSULIN (HCC): ICD-10-CM

## 2021-05-06 DIAGNOSIS — R74.8 ELEVATED CK: ICD-10-CM

## 2021-05-06 DIAGNOSIS — E78.2 MIXED HYPERLIPIDEMIA: ICD-10-CM

## 2021-05-06 DIAGNOSIS — M79.10 MYALGIA: ICD-10-CM

## 2021-05-08 LAB
ALBUMIN SERPL-MCNC: 4.3 G/DL (ref 3.8–4.8)
ALBUMIN/GLOB SERPL: 1.9 {RATIO} (ref 1.2–2.2)
ALP SERPL-CCNC: 70 IU/L (ref 39–117)
ALT SERPL-CCNC: 28 IU/L (ref 0–44)
AST SERPL-CCNC: 24 IU/L (ref 0–40)
BASOPHILS # BLD AUTO: 0 X10E3/UL (ref 0–0.2)
BASOPHILS NFR BLD AUTO: 1 %
BILIRUB SERPL-MCNC: 0.6 MG/DL (ref 0–1.2)
BUN SERPL-MCNC: 17 MG/DL (ref 8–27)
BUN/CREAT SERPL: 18 (ref 10–24)
CALCIUM SERPL-MCNC: 9.2 MG/DL (ref 8.6–10.2)
CHLORIDE SERPL-SCNC: 105 MMOL/L (ref 96–106)
CHOLEST SERPL-MCNC: 251 MG/DL (ref 100–199)
CK SERPL-CCNC: 330 U/L (ref 41–331)
CO2 SERPL-SCNC: 25 MMOL/L (ref 20–29)
CREAT SERPL-MCNC: 0.93 MG/DL (ref 0.76–1.27)
EOSINOPHIL # BLD AUTO: 0.1 X10E3/UL (ref 0–0.4)
EOSINOPHIL NFR BLD AUTO: 3 %
ERYTHROCYTE [DISTWIDTH] IN BLOOD BY AUTOMATED COUNT: 12.8 % (ref 11.6–15.4)
GLOBULIN SER CALC-MCNC: 2.3 G/DL (ref 1.5–4.5)
GLUCOSE SERPL-MCNC: 107 MG/DL (ref 65–99)
HBA1C MFR BLD: 5.7 % (ref 4.8–5.6)
HCT VFR BLD AUTO: 45.6 % (ref 37.5–51)
HDLC SERPL-MCNC: 55 MG/DL
HGB BLD-MCNC: 15.7 G/DL (ref 13–17.7)
IMM GRANULOCYTES # BLD AUTO: 0 X10E3/UL (ref 0–0.1)
IMM GRANULOCYTES NFR BLD AUTO: 0 %
LDLC SERPL CALC-MCNC: 187 MG/DL (ref 0–99)
LDLC/HDLC SERPL: 3.4 RATIO (ref 0–3.6)
LYMPHOCYTES # BLD AUTO: 0.8 X10E3/UL (ref 0.7–3.1)
LYMPHOCYTES NFR BLD AUTO: 24 %
MCH RBC QN AUTO: 30.9 PG (ref 26.6–33)
MCHC RBC AUTO-ENTMCNC: 34.4 G/DL (ref 31.5–35.7)
MCV RBC AUTO: 90 FL (ref 79–97)
MONOCYTES # BLD AUTO: 0.5 X10E3/UL (ref 0.1–0.9)
MONOCYTES NFR BLD AUTO: 13 %
NEUTROPHILS # BLD AUTO: 2 X10E3/UL (ref 1.4–7)
NEUTROPHILS NFR BLD AUTO: 59 %
PLATELET # BLD AUTO: 166 X10E3/UL (ref 150–450)
POTASSIUM SERPL-SCNC: 4.1 MMOL/L (ref 3.5–5.2)
PROT SERPL-MCNC: 6.6 G/DL (ref 6–8.5)
RBC # BLD AUTO: 5.08 X10E6/UL (ref 4.14–5.8)
SARS-COV-2 AB SERPL QL IA: POSITIVE
SODIUM SERPL-SCNC: 143 MMOL/L (ref 134–144)
TRIGL SERPL-MCNC: 58 MG/DL (ref 0–149)
VLDLC SERPL CALC-MCNC: 9 MG/DL (ref 5–40)
WBC # BLD AUTO: 3.5 X10E3/UL (ref 3.4–10.8)

## 2021-05-08 RX ORDER — OLMESARTAN MEDOXOMIL AND HYDROCHLOROTHIAZIDE 40/12.5 40; 12.5 MG/1; MG/1
1 TABLET ORAL DAILY
Qty: 90 TABLET | Refills: 1 | Status: SHIPPED | OUTPATIENT
Start: 2021-05-08 | End: 2021-10-01 | Stop reason: SDUPTHER

## 2021-05-14 ENCOUNTER — OFFICE VISIT (OUTPATIENT)
Dept: FAMILY MEDICINE CLINIC | Facility: CLINIC | Age: 66
End: 2021-05-14

## 2021-05-14 VITALS
HEART RATE: 71 BPM | SYSTOLIC BLOOD PRESSURE: 126 MMHG | BODY MASS INDEX: 33.05 KG/M2 | HEIGHT: 73 IN | DIASTOLIC BLOOD PRESSURE: 74 MMHG | OXYGEN SATURATION: 98 % | TEMPERATURE: 98.4 F | WEIGHT: 249.4 LBS

## 2021-05-14 DIAGNOSIS — M79.10 MYALGIA: ICD-10-CM

## 2021-05-14 DIAGNOSIS — E11.9 TYPE 2 DIABETES MELLITUS WITHOUT COMPLICATION, WITHOUT LONG-TERM CURRENT USE OF INSULIN (HCC): ICD-10-CM

## 2021-05-14 DIAGNOSIS — R74.8 ELEVATED CK: ICD-10-CM

## 2021-05-14 DIAGNOSIS — Z78.9 STATIN INTOLERANCE: ICD-10-CM

## 2021-05-14 DIAGNOSIS — E78.2 MIXED HYPERLIPIDEMIA: ICD-10-CM

## 2021-05-14 DIAGNOSIS — I10 ESSENTIAL HYPERTENSION: Primary | ICD-10-CM

## 2021-05-14 PROBLEM — Z86.19 HISTORY OF VIRAL ILLNESS: Status: RESOLVED | Noted: 2020-07-24 | Resolved: 2021-05-14

## 2021-05-14 PROBLEM — R42 LIGHTHEADED: Status: RESOLVED | Noted: 2020-08-28 | Resolved: 2021-05-14

## 2021-05-14 PROCEDURE — 99214 OFFICE O/P EST MOD 30 MIN: CPT | Performed by: FAMILY MEDICINE

## 2021-05-14 RX ORDER — GEMFIBROZIL 600 MG/1
600 TABLET, FILM COATED ORAL 2 TIMES DAILY
Qty: 180 TABLET | Refills: 1 | Status: SHIPPED | OUTPATIENT
Start: 2021-05-14 | End: 2022-02-16

## 2021-05-14 RX ORDER — GEMFIBROZIL 600 MG/1
600 TABLET, FILM COATED ORAL
COMMUNITY
End: 2021-05-14 | Stop reason: SDUPTHER

## 2021-05-14 RX ORDER — AMLODIPINE BESYLATE 10 MG/1
10 TABLET ORAL DAILY
Qty: 90 TABLET | Refills: 1 | Status: SHIPPED | OUTPATIENT
Start: 2021-05-14 | End: 2021-10-01 | Stop reason: SDUPTHER

## 2021-05-14 NOTE — PROGRESS NOTES
Subjective   Simone Kyle is a 66 y.o. male.     Vitals:    05/14/21 1136   BP: 134/84   Pulse: 71   Temp: 98.4 °F (36.9 °C)   SpO2: 98%        Chief Complaint   Patient presents with   • Hypertension     CHECK UP FOLLOW UP LABS   • Hyperlipidemia        History of Present Illness    5-month follow-up on HTN, hyperlipidemia, myalgias, and elevated CK and NASIM    LOV with me in December for uncontrolled hyperlipidemia, uncontrolled HTN, and persistently elevated CK and newly diagnosed positive NASIM.  At last visit, several changes were made:  --Norvasc was increased to 10 mg daily for uncontrolled HTN --he did do this and is tolerating without side effects.  --Welchol was added for persistently uncontrolled LDL (202) due to statin and Zetia intolerance --however, apparently this was changed to Lopid 600 mg twice daily either by his insurance or telephone call in which he called stating he did not want to take that much medication!  On a good note, he is tolerating this medication without side effects but has only been taking it twice daily approximately 75% of the time.  --Also, he was referred to rheumatology due to persistently elevated CK and the additional finding of a positive NASIM --he did see Dr. Yisel Perkins who recommended no further work-up, believed the elevated CK was just secondary to prior statin history.   --He was asked to follow-up in 3 months; however, due to rescheduled visits this was the soonest he was able to get in.    Overall, doing much better, especially in terms of the myalgias.  Compliant with and tolerating the new medications without side effects, except may forget the second dose of Lopid.  Has worked to improve upon his diet; however, still consumes a lot of red meats and cheese products.  Weight stable.    No complaints today.  Recently had lab work, last week and is here to follow-up.  Uncertain about needed refills?    The following portions of the patient's history were reviewed and  updated as appropriate: allergies, current medications, past family history, past medical history, past social history, past surgical history and problem list.    Review of Systems   Constitutional: Negative for fever, unexpected weight gain and unexpected weight loss.   Respiratory: Negative for cough and shortness of breath.    Cardiovascular: Negative for chest pain.       Objective   Physical Exam  Vitals and nursing note reviewed.   Constitutional:       Appearance: Normal appearance. He is well-developed. He is obese.   HENT:      Head: Normocephalic and atraumatic.      Nose: Nose normal.   Eyes:      Conjunctiva/sclera: Conjunctivae normal.      Pupils: Pupils are equal, round, and reactive to light.   Neck:      Thyroid: No thyromegaly.   Cardiovascular:      Rate and Rhythm: Normal rate and regular rhythm.      Heart sounds: Normal heart sounds. No murmur heard.     Pulmonary:      Effort: Pulmonary effort is normal.      Breath sounds: Normal breath sounds.   Abdominal:      General: Abdomen is flat. Bowel sounds are normal. There is no distension.      Palpations: Abdomen is soft. There is no hepatomegaly, splenomegaly or mass.      Tenderness: There is no abdominal tenderness. There is no guarding or rebound.      Hernia: No hernia is present.   Musculoskeletal:         General: Normal range of motion.      Cervical back: Normal range of motion and neck supple.      Right lower leg: No edema.      Left lower leg: No edema.   Lymphadenopathy:      Cervical: No cervical adenopathy.   Skin:     General: Skin is warm.   Neurological:      General: No focal deficit present.      Mental Status: He is alert.   Psychiatric:         Mood and Affect: Mood normal.         Behavior: Behavior normal.         Thought Content: Thought content normal.         Judgment: Judgment normal.          LABS/STUDIES  -May 2021 -- (normal range up to 331), A1c 5.7,  (previously 202), normal CMP, normal  CBC    Procedures     Assessment/Plan   Diagnoses and all orders for this visit:    1. Essential hypertension (Primary)  -controlled.  No change of medication.  Refill/continue Norvasc 10 mg 1 p.o. daily  Continue Benicar HCT as prescribed, will refill upon request  Continue ASA 81 mg daily  We will recheck CMP in 3 months  -     Comprehensive Metabolic Panel; Future    2. Mixed hyperlipidemia  -uncontrolled.  Due to statins and Zetia intolerance (myalgias and elevated CPK) need to continue with just Lopid 600 mg twice daily, will refill today  Needs to improve upon a low-cholesterol diet (decrease red meats and cheese products) and increase cardio exercise to greater than 150 minutes weekly  We will recheck lipids and CMP in 3 months  -     Comprehensive Metabolic Panel; Future  -     Lipid Panel With LDL / HDL Ratio; Future    3. Elevated CK  -stable.  Clinically improved despite slightly elevated CK level; however, note CK level ranges have changed.  Needs to remain off statins and Zetia.  We will continue to monitor CK level, recheck in 3 months  -     CK; Future    4. Myalgia  -clinically improved.  S/P rheumatology work-up, negative.    5. Type 2 diabetes mellitus without complication, without long-term current use of insulin (CMS/[facility])  -controlled.  Remains diet controlled  Needs low-carb/low calorie/low cholesterol diet and increase cardio exercise to greater than 150 minutes weekly    Other orders  -     amLODIPine (NORVASC) 10 MG tablet; Take 1 tablet by mouth Daily.  Dispense: 90 tablet; Refill: 1  -     gemfibrozil (LOPID) 600 MG tablet; Take 1 tablet by mouth 2 (Two) Times a Day.  Dispense: 180 tablet; Refill: 1                 Wore goggles and face mask during entire visit.    Return in about 3 months (around 8/14/2021) for Annual physical,LAB FIRST THEN APPT.

## 2021-10-01 ENCOUNTER — OFFICE VISIT (OUTPATIENT)
Dept: FAMILY MEDICINE CLINIC | Facility: CLINIC | Age: 66
End: 2021-10-01

## 2021-10-01 VITALS
TEMPERATURE: 98 F | HEIGHT: 73 IN | WEIGHT: 248.2 LBS | SYSTOLIC BLOOD PRESSURE: 132 MMHG | OXYGEN SATURATION: 99 % | HEART RATE: 62 BPM | BODY MASS INDEX: 32.89 KG/M2 | DIASTOLIC BLOOD PRESSURE: 86 MMHG

## 2021-10-01 DIAGNOSIS — Z20.822 CLOSE EXPOSURE TO COVID-19 VIRUS: ICD-10-CM

## 2021-10-01 DIAGNOSIS — Z12.5 ENCOUNTER FOR PROSTATE CANCER SCREENING: ICD-10-CM

## 2021-10-01 DIAGNOSIS — Z78.9 STATIN INTOLERANCE: ICD-10-CM

## 2021-10-01 DIAGNOSIS — R74.8 ELEVATED CK: ICD-10-CM

## 2021-10-01 DIAGNOSIS — Z00.00 WELLNESS EXAMINATION: Primary | ICD-10-CM

## 2021-10-01 DIAGNOSIS — G12.21 FAMILIAL ALS (AMYOTROPHIC LATERAL SCLEROSIS) (HCC): ICD-10-CM

## 2021-10-01 DIAGNOSIS — M15.9 GENERALIZED OA: ICD-10-CM

## 2021-10-01 DIAGNOSIS — I10 ESSENTIAL HYPERTENSION: ICD-10-CM

## 2021-10-01 DIAGNOSIS — M79.10 MYALGIA: ICD-10-CM

## 2021-10-01 DIAGNOSIS — E78.2 MIXED HYPERLIPIDEMIA: ICD-10-CM

## 2021-10-01 DIAGNOSIS — Z23 NEED FOR 23-POLYVALENT PNEUMOCOCCAL POLYSACCHARIDE VACCINE: ICD-10-CM

## 2021-10-01 PROCEDURE — 90471 IMMUNIZATION ADMIN: CPT | Performed by: FAMILY MEDICINE

## 2021-10-01 PROCEDURE — 90732 PPSV23 VACC 2 YRS+ SUBQ/IM: CPT | Performed by: FAMILY MEDICINE

## 2021-10-01 PROCEDURE — 99397 PER PM REEVAL EST PAT 65+ YR: CPT | Performed by: FAMILY MEDICINE

## 2021-10-01 PROCEDURE — 82270 OCCULT BLOOD FECES: CPT | Performed by: FAMILY MEDICINE

## 2021-10-01 PROCEDURE — 99214 OFFICE O/P EST MOD 30 MIN: CPT | Performed by: FAMILY MEDICINE

## 2021-10-01 RX ORDER — AMLODIPINE BESYLATE 10 MG/1
10 TABLET ORAL DAILY
Qty: 90 TABLET | Refills: 1 | Status: SHIPPED | OUTPATIENT
Start: 2021-10-01 | End: 2022-04-06 | Stop reason: SDUPTHER

## 2021-10-01 RX ORDER — OLMESARTAN MEDOXOMIL AND HYDROCHLOROTHIAZIDE 40/12.5 40; 12.5 MG/1; MG/1
1 TABLET ORAL DAILY
Qty: 90 TABLET | Refills: 1 | Status: SHIPPED | OUTPATIENT
Start: 2021-10-01 | End: 2022-05-01 | Stop reason: SDUPTHER

## 2021-10-01 RX ORDER — GEMFIBROZIL 600 MG/1
600 TABLET, FILM COATED ORAL 2 TIMES DAILY
Qty: 180 TABLET | Refills: 1 | Status: CANCELLED | OUTPATIENT
Start: 2021-10-01

## 2021-10-01 NOTE — PATIENT INSTRUCTIONS
Follow-up with Dr. Perkins    To neurologist    Stop Lopid    Recommend low fat/low calorie diet and exercise greater than 150 minutes of cardio per week.   Continue current treatment plan.

## 2021-10-01 NOTE — PROGRESS NOTES
"Subjective   Simone Kyle is a 66 y.o. male.     Vitals:    10/01/21 1316   BP: 132/86   Pulse: 62   Temp: 98 °F (36.7 °C)   SpO2: 99%        Chief Complaint   Patient presents with   • Annual Exam     YEARLY PHYSICAL HAD LABS LAST WEEK DID COLOGUARD LAST YEAR   • Hypertension     follow up myalgias and CK   • Hyperlipidemia        History of Present Illness    Presents for Annual well Man exam   And  3 month F/U on HTN, Hyperlipidemia, and abnormal CK    LOV with me in May for chronic med refills and labs.  No changes were made at that visit given his CK level had improved and he was feeling better clinically.     Currently, feeling okay.   Some mild improvement continues in the lower extremity myalgias.  Diet remains fair, active lifestyle.  Weight stable.     Fairly good compliance with Lopid. Always compliant with BP meds.   Previously worked up last year for elevated CK by Rheum/Gregorio -- \"mild elevation, benign etiology.\"  No reported weakness or falls.  FH significant for ALS --mother Dx at age 69.    Routine Health Maintenance/screening Tests:  Cologuard July 2020, negative  Last PSA with NE July 2020  Vaccinations all up to date except needs Pneumovax  Dental and Opthal visits up to date.  Non smoker, social Etoh only  Works part time at [employer]  Tri to maintain healthy diet, could do better with lower chol. Active lifestyle, enjoys golfing.   Family history significant for ALS in mother, negative for Colon cancer, Prostate cancer, and Premature CAD    The following portions of the patient's history were reviewed and updated as appropriate: allergies, current medications, past family history, past medical history, past social history, past surgical history and problem list.    Review of Systems   Constitutional: Negative for fever, unexpected weight gain and unexpected weight loss.   Respiratory: Negative for cough and shortness of breath.    Cardiovascular: Negative for chest pain.       Objective "   Physical Exam  Vitals and nursing note reviewed. Exam conducted with a chaperone present.   Constitutional:       Appearance: Normal appearance. He is well-developed. He is obese.   HENT:      Head: Normocephalic and atraumatic.      Right Ear: External ear normal.      Left Ear: External ear normal.      Nose: Nose normal.   Eyes:      Conjunctiva/sclera: Conjunctivae normal.      Pupils: Pupils are equal, round, and reactive to light.   Neck:      Thyroid: No thyromegaly.      Vascular: No carotid bruit.   Cardiovascular:      Rate and Rhythm: Normal rate and regular rhythm.      Pulses: Normal pulses.      Heart sounds: Normal heart sounds. No murmur heard.     Pulmonary:      Effort: Pulmonary effort is normal.      Breath sounds: Normal breath sounds.   Abdominal:      General: Abdomen is flat. Bowel sounds are normal. There is no distension.      Palpations: Abdomen is soft. There is no hepatomegaly or splenomegaly.      Tenderness: There is no abdominal tenderness.      Hernia: There is no hernia in the left inguinal area or right inguinal area.   Genitourinary:     Penis: Normal.       Testes: Normal.         Right: Mass or tenderness not present.         Left: Mass or tenderness not present.      Prostate: Normal.      Rectum: Normal. Guaiac result negative.   Musculoskeletal:         General: Normal range of motion.      Cervical back: Normal range of motion.      Right lower leg: No edema.      Left lower leg: No edema.   Lymphadenopathy:      Cervical: No cervical adenopathy.      Lower Body: No right inguinal adenopathy. No left inguinal adenopathy.   Skin:     General: Skin is warm and dry.      Capillary Refill: Capillary refill takes less than 2 seconds.      Comments: No dysplastic nevi or abnormal lesions   Neurological:      General: No focal deficit present.      Mental Status: He is alert and oriented to person, place, and time. Mental status is at baseline.      Cranial Nerves: No cranial  nerve deficit.      Sensory: No sensory deficit.      Motor: No weakness.      Coordination: Coordination normal.      Gait: Gait normal.      Deep Tendon Reflexes: Reflexes normal.   Psychiatric:         Mood and Affect: Mood normal.         Behavior: Behavior normal.         Thought Content: Thought content normal.         Judgment: Judgment normal.          LABS/STUDIES  - 9/24/21 --  (330,)  (187,)CMP and CBC normal, A1c 5.7    Procedures     Assessment/Plan   Diagnoses and all orders for this visit:    1. Wellness examination (Primary)  - with NE/PSA exam done, wnl.  All screening up to date except needs PSA, Pneumovax -23, Influenza, Tdap, and Zostrix vaccines (plans to do these in future.)   Considering Covid booster, would like Antibodies checked.   See orders below.   -     POCT occult blood x 1 stool    2. Encounter for prostate cancer screening  - S/P NE with PSA, done wnl.   -     PSA Screen    3. Elevated CK  - remains uncontrolled and rising.  Given FH of ALS, rising CK, and myalgias will refer to neurologist.   Rheum eval/Dr. Perkins last year, negative.   May need muscle biopsy?  Hopefully all just sequela from statins in past.  Stop Lopid as well. Remain off all lipid meds.   -     CK; Future    4. Myalgia  - same plan as above.    5. Mixed hyperlipidemia  - remains uncontrolled even with Lopid  Given rising CK, needs to stay off all lipid meds for now.   Really important to make improvements with low chol diet given intol to all lipid lowering meds.  Plan to recheck labs in 3 months.  -     Lipid Panel With LDL / HDL Ratio; Future  -     Basic Metabolic Panel; Future  -     Hemoglobin A1c; Future    6. Essential hypertension  - controlled  Cont both Benicar and Norvasc as prescribed below.  Cont ASA 81 mg q day.  -     Lipid Panel With LDL / HDL Ratio; Future  -     Basic Metabolic Panel; Future  -     Hemoglobin A1c; Future    7. Generalized OA  - controlled.   Cont MObic 15 mg q day  prn.    8. Statin intolerance    9. Familial ALS (amyotrophic lateral sclerosis) (HCC)  - to neurologist  -     Ambulatory Referral to Neurology    10. Close exposure to COVID-19 virus  - no close exposure, just desires to know antibody status prior to receiving Covid booster.  -     SARS-CoV-2 Antibodies (Roche)    11. Need for 23-polyvalent pneumococcal polysaccharide vaccine  -     Pneumococcal Polysaccharide Vaccine 23-Valent (PPSV23) Greater Than or Equal To 1yo Subcutaneous / IM    Other orders  -     amLODIPine (NORVASC) 10 MG tablet; Take 1 tablet by mouth Daily.  Dispense: 90 tablet; Refill: 1  -     Cancel: gemfibrozil (LOPID) 600 MG tablet; Take 1 tablet by mouth 2 (Two) Times a Day.  Dispense: 180 tablet; Refill: 1  -     olmesartan-hydrochlorothiazide (BENICAR HCT) 40-12.5 MG per tablet; TAKE 1 TABLET BY MOUTH DAILY  Dispense: 90 tablet; Refill: 1  -     SARS-CoV-2 Antibodies (Roche)                 Wore goggles and face mask during entire visit.    Return in about 3 months (around 1/1/2022) for Recheck, labs first then appointment with me.

## 2021-10-02 LAB
PSA SERPL-MCNC: 1.59 NG/ML (ref 0–4)
SARS-COV-2 AB SERPL QL IA: POSITIVE

## 2021-10-28 ENCOUNTER — TELEPHONE (OUTPATIENT)
Dept: FAMILY MEDICINE CLINIC | Facility: CLINIC | Age: 66
End: 2021-10-28

## 2021-10-28 NOTE — TELEPHONE ENCOUNTER
Caller: Simone Kyle    Relationship to patient: Self    Best call back number: [phone]    Patient is needing: PATIENT STATES THAT DR. LEE WAS WANTING HIM TO SEE A NEUROLOGIST. HE CANT GET IN TO SEE THEM TILL 1/28/22. HE WANTS TO KNOW IF SHE WANTS HIM TO BE SEEN BY THEM BEFORE HE SEES HER AGAIN. PLEASE REACH OUT AND ADVISE.

## 2022-01-28 ENCOUNTER — OFFICE VISIT (OUTPATIENT)
Dept: NEUROLOGY | Facility: CLINIC | Age: 67
End: 2022-01-28

## 2022-01-28 VITALS
WEIGHT: 255 LBS | HEART RATE: 74 BPM | SYSTOLIC BLOOD PRESSURE: 132 MMHG | HEIGHT: 73 IN | BODY MASS INDEX: 33.8 KG/M2 | OXYGEN SATURATION: 98 % | DIASTOLIC BLOOD PRESSURE: 86 MMHG

## 2022-01-28 DIAGNOSIS — R74.8 ELEVATED CK: Primary | ICD-10-CM

## 2022-01-28 PROCEDURE — 99204 OFFICE O/P NEW MOD 45 MIN: CPT | Performed by: PSYCHIATRY & NEUROLOGY

## 2022-01-28 NOTE — PROGRESS NOTES
Chief Complaint   Patient presents with   • Amyotrophic Lateral Sclerosis       Patient ID: Simone Kyle is a 66 y.o. male.    HPI: I had the pleasure of seeing your patient today.  As you may know he is a 66-year-old gentleman being seen at the request of and referred to us by Dr. Diana Bennett for history of elevated CK level.  Also in history, had ALS apparently.  Patient states that he had been on cholesterol-lowering medications including both Lipitor and Crestor.  He did experience significant cramping and aching sensations in his legs primarily however would also experience those symptoms in his arms.  His cramping and pain subsided once the statin medications were discontinued.  He was then prescribed gemfibrozil.  He did have some elevated CK levels.  They have ranged from 361 in March 2020 to the most recent which was in September 2021 at 471.  In May of last year the CK was within normal range.  He denies ever experiencing symptoms of weakness of his arms or legs.  No muscle twitching or jerking movements of his muscles.  No significant cramps of his arms or legs.  He denies any trouble swallowing.  No head heaviness.  No nasal speech or slurred speech.  He does not have any issues choking on liquids or food.    The following portions of the patient's history were reviewed and updated as appropriate: allergies, current medications, past family history, past medical history, past social history, past surgical history and problem list.    Review of Systems   Musculoskeletal: Negative for back pain, gait problem and neck pain.   Allergic/Immunologic: Negative for environmental allergies, food allergies and immunocompromised state.   Neurological: Positive for numbness. Negative for dizziness, tremors, seizures, syncope, facial asymmetry, speech difficulty, weakness, light-headedness and headaches.   Hematological: Negative for adenopathy. Does not bruise/bleed easily.   Psychiatric/Behavioral: Negative for  agitation, behavioral problems, confusion, decreased concentration, dysphoric mood, hallucinations, self-injury, sleep disturbance and suicidal ideas. The patient is not nervous/anxious and is not hyperactive.       I have reviewed the review of systems above performed by my medical assistant.      Vitals:    22 0809   BP: 132/86   Pulse: 74   SpO2: 98%       Neurologic Exam     Mental Status   Oriented to person, place, and time.   Registration: recalls 3 of 3 objects. Follows 3 step commands.   Attention: normal. Concentration: normal.   Speech: speech is normal   Level of consciousness: alert  Knowledge: consistent with education (No deficits found.).   Normal comprehension.     Cranial Nerves     CN II   Visual fields full to confrontation.     CN III, IV, VI   Pupils are equal, round, and reactive to light.  Extraocular motions are normal.   CN III: no CN III palsy  CN VI: no CN VI palsy  Nystagmus: none   Diplopia: none    CN V   Facial sensation intact.     CN VII   Facial expression full, symmetric.     CN VIII   CN VIII normal.     CN IX, X   CN IX normal.   CN X normal.     CN XI   CN XI normal.     CN XII   CN XII normal.     Motor Exam   Muscle bulk: normal  Right arm tone: normal  Left arm tone: normal  Right leg tone: normal  Left leg tone: normal    Strength   Right neck flexion: 5/5  Left neck flexion: 5/5  Right neck extension: 5/5  Left neck extension: 5/5  Right deltoid: 5/5  Left deltoid: 5/5  Right biceps: 5/5  Left biceps: 5/5  Right triceps: 5/5  Left triceps: 5/5  Right wrist flexion: 5/5  Left wrist flexion: 5/5  Right wrist extension: 5/5  Left wrist extension: 5/5  Right interossei: 5/5  Left interossei: 5/5  Right abdominals: 5/5  Left abdominals: 5/5  Right iliopsoas: 5/5  Left iliopsoas: 5/5  Right quadriceps: 5/5  Left quadriceps: 5/5  Right hamstrin/5  Left hamstrin/5  Right glutei: 5/5  Left glutei: 5/5  Right anterior tibial: 5/5  Left anterior tibial: 5/5  Right  posterior tibial: 5/5  Left posterior tibial: 5/5  Right peroneal: 5/5  Left peroneal: 5/5  Right gastroc: 5/5  Left gastroc: 5/5    Sensory Exam   Light touch normal.   Vibration normal.   Proprioception normal.   Pinprick normal.     Gait, Coordination, and Reflexes     Gait  Gait: normal    Coordination   Romberg: negative    Tremor   Resting tremor: absent  Intention tremor: absent    Reflexes   Right brachioradialis: 2+  Left brachioradialis: 2+  Right biceps: 2+  Left biceps: 2+  Right triceps: 2+  Left triceps: 2+  Right patellar: 2+  Left patellar: 2+  Right achilles: 2+  Left achilles: 2+  Right : 2+  Left : 2+Station is normal.       Physical Exam  Vitals reviewed.   Constitutional:       General: He is not in acute distress.     Appearance: He is well-developed.   HENT:      Head: Normocephalic and atraumatic.   Eyes:      Extraocular Movements: EOM normal.      Pupils: Pupils are equal, round, and reactive to light.   Cardiovascular:      Rate and Rhythm: Normal rate and regular rhythm.      Heart sounds: Normal heart sounds.   Pulmonary:      Effort: Pulmonary effort is normal. No respiratory distress.      Breath sounds: Normal breath sounds.   Abdominal:      General: Bowel sounds are normal. There is no distension.      Palpations: Abdomen is soft.      Tenderness: There is no abdominal tenderness.   Musculoskeletal:         General: No deformity.      Cervical back: Normal range of motion.   Skin:     General: Skin is warm.      Findings: No rash.   Neurological:      Mental Status: He is oriented to person, place, and time.      Coordination: Romberg Test normal.      Gait: Gait is intact.      Deep Tendon Reflexes:      Reflex Scores:       Tricep reflexes are 2+ on the right side and 2+ on the left side.       Bicep reflexes are 2+ on the right side and 2+ on the left side.       Brachioradialis reflexes are 2+ on the right side and 2+ on the left side.       Patellar reflexes are 2+ on  the right side and 2+ on the left side.       Achilles reflexes are 2+ on the right side and 2+ on the left side.  Psychiatric:         Speech: Speech normal.         Judgment: Judgment normal.         Procedures    Assessment/Plan: He has a completely normal neurological examination today.  I do not appreciate any fasciculations of the arms or leg muscle groups.  With his elevated CK we will check an EMG/nerve conduction study however it is possibly related to his history of statin use.  He has no significant muscle weakness or inflammation.  No history of rash.  We likely are not experiencing a myopathy.  However the electrophysiological evaluation would be warranted.  We will see him back after testing.       Diagnoses and all orders for this visit:    1. Elevated CK (Primary)  -     EMG & Nerve Conduction Test; Future           Robert Hale II, MD

## 2022-02-02 ENCOUNTER — PATIENT ROUNDING (BHMG ONLY) (OUTPATIENT)
Dept: NEUROLOGY | Facility: CLINIC | Age: 67
End: 2022-02-02

## 2022-02-16 ENCOUNTER — TELEPHONE (OUTPATIENT)
Dept: FAMILY MEDICINE CLINIC | Facility: CLINIC | Age: 67
End: 2022-02-16

## 2022-02-16 ENCOUNTER — OFFICE VISIT (OUTPATIENT)
Dept: FAMILY MEDICINE CLINIC | Facility: CLINIC | Age: 67
End: 2022-02-16

## 2022-02-16 VITALS
SYSTOLIC BLOOD PRESSURE: 142 MMHG | BODY MASS INDEX: 33.27 KG/M2 | DIASTOLIC BLOOD PRESSURE: 76 MMHG | TEMPERATURE: 97 F | WEIGHT: 251 LBS | HEART RATE: 89 BPM | OXYGEN SATURATION: 98 % | HEIGHT: 73 IN

## 2022-02-16 DIAGNOSIS — J02.9 SORE THROAT: Primary | ICD-10-CM

## 2022-02-16 DIAGNOSIS — J30.9 ALLERGIC RHINITIS, UNSPECIFIED SEASONALITY, UNSPECIFIED TRIGGER: ICD-10-CM

## 2022-02-16 DIAGNOSIS — B37.0 THRUSH: ICD-10-CM

## 2022-02-16 LAB
EXPIRATION DATE: NORMAL
INTERNAL CONTROL: NORMAL
Lab: NORMAL
S PYO AG THROAT QL: NEGATIVE

## 2022-02-16 PROCEDURE — 99213 OFFICE O/P EST LOW 20 MIN: CPT | Performed by: NURSE PRACTITIONER

## 2022-02-16 PROCEDURE — 87880 STREP A ASSAY W/OPTIC: CPT | Performed by: NURSE PRACTITIONER

## 2022-02-16 RX ORDER — MELOXICAM 15 MG/1
15 TABLET ORAL DAILY
Qty: 30 TABLET | Refills: 0 | Status: SHIPPED | OUTPATIENT
Start: 2022-02-16 | End: 2022-07-29 | Stop reason: SDUPTHER

## 2022-02-16 RX ORDER — FLUTICASONE PROPIONATE 50 MCG
2 SPRAY, SUSPENSION (ML) NASAL DAILY
Qty: 16 G | Refills: 0 | Status: SHIPPED | OUTPATIENT
Start: 2022-02-16 | End: 2023-02-17

## 2022-02-16 NOTE — PROGRESS NOTES
"Chief Complaint  Sore Throat (since thursday sore throat, dry lingering cough, new onset R ear pain 3x neg covid test)    Masks/face shield/appropriate PPE were worn for the entirety of the visit by the patient, MA, and provider.     Subjective          Simone Kyle presents to [facility]  History of Present Illness  Simone Kyle is a 66 y.o. male who presents for complaints of cough and a sore throat. Patient states that the symptoms started Thursday with a cough and sore throat. He did play golf in the cold on Friday, which could have worsened things. He states that his cough is dry and not productive. He denies rhinorrhea, but does complain of postnasal drip. He denies congestion. He denies shortness of breath. He states typically he has a very runny nose, but feels as if his nasal passages have been dry recently. He is not currently on any allergy medications. He denies recent antibiotic or steroid use. He does work at [employer] and is unsure if he has been exposed to any illnesses. He did do an at home COVID-19 test on Thursday, Sunday, and today that were negative. His right ear is starting to ache as well.     Review of Systems   Constitutional: Negative.    HENT: Positive for ear pain, sore throat and voice change.    Eyes: Negative.    Respiratory: Positive for cough.    Cardiovascular: Negative.    Gastrointestinal: Negative.    Endocrine: Negative.    Genitourinary: Negative.    Musculoskeletal: Negative.    Skin: Negative.    Allergic/Immunologic: Negative.    Neurological: Negative.    Hematological: Negative.    Psychiatric/Behavioral: Negative.        Objective   Vital Signs:   /76   Pulse 89   Temp 97 °F (36.1 °C)   Ht 185.4 cm (73\")   Wt 114 kg (251 lb)   SpO2 98%   BMI 33.12 kg/m²     Physical Exam  Vitals reviewed.   Constitutional:       General: He is not in acute distress.     Appearance: Normal appearance. He is not ill-appearing, toxic-appearing or " diaphoretic.   HENT:      Head: Normocephalic and atraumatic.      Right Ear: Tympanic membrane, ear canal and external ear normal.      Left Ear: Tympanic membrane, ear canal and external ear normal.      Mouth/Throat:      Pharynx: Posterior oropharyngeal erythema present.      Comments: Few white patches to tongue  Eyes:      General: No scleral icterus.        Right eye: No discharge.         Left eye: No discharge.      Extraocular Movements: Extraocular movements intact.   Cardiovascular:      Rate and Rhythm: Normal rate and regular rhythm.      Heart sounds: Normal heart sounds.   Pulmonary:      Effort: No respiratory distress.      Breath sounds: No stridor. Rhonchi present. No wheezing.   Musculoskeletal:      Right lower leg: No edema.      Left lower leg: No edema.   Skin:     General: Skin is warm.   Neurological:      General: No focal deficit present.      Mental Status: He is alert and oriented to person, place, and time.      Motor: No weakness.      Gait: Gait normal.   Psychiatric:         Mood and Affect: Mood normal.         Behavior: Behavior normal.        Result Review :     Strep    Common Labsle 2/16/22   POC Strep A, Molecular Negative                     Assessment and Plan    Diagnoses and all orders for this visit:    1. Sore throat (Primary)  -     POCT rapid strep A  -     nystatin (MYCOSTATIN) 100,000 unit/mL suspension; Swish and swallow 5 mL 4 (Four) Times a Day for 7 days.  Dispense: 140 mL; Refill: 0    2. Thrush  -     nystatin (MYCOSTATIN) 100,000 unit/mL suspension; Swish and swallow 5 mL 4 (Four) Times a Day for 7 days.  Dispense: 140 mL; Refill: 0    3. Allergic rhinitis, unspecified seasonality, unspecified trigger  -     fluticasone (Flonase) 50 MCG/ACT nasal spray; 2 sprays into the nostril(s) as directed by provider Daily.  Dispense: 16 g; Refill: 0    Other orders  -     meloxicam (MOBIC) 15 MG tablet; Take 1 tablet by mouth Daily.  Dispense: 30 tablet; Refill:  0      Patient presents today with complaints of a sore throat. Strep testing was negative. There is presence of scattered white patches to his tongue resembling oral candidiasis. For this reason, nystatin suspension has been prescribed today. Patient was advised he can swish and swallow this medicine or swish and spit if it upsets his stomach. This may be causing some of patient's sore throat. He does have presence of posterior oropharyngeal erythema and postnasal drip. This may be also causing his cough and sore throat. For this reason, Flonase has been prescribed today. Patient was advised to call our office if his symptoms worsen or persist.      Follow Up   Return if symptoms worsen or fail to improve. Will discuss with Dr. Bennett when to follow-up as he has recently been evaluated by neurology.   Patient was given instructions and counseling regarding his condition or for health maintenance advice. Please see specific information pulled into the AVS if appropriate.     Electronically signed by TAMMI Alvarez, 02/17/22, 7:33 AM EST.

## 2022-03-12 ENCOUNTER — IMMUNIZATION (OUTPATIENT)
Dept: VACCINE CLINIC | Facility: HOSPITAL | Age: 67
End: 2022-03-12

## 2022-03-12 DIAGNOSIS — Z23 NEED FOR VACCINATION: Primary | ICD-10-CM

## 2022-03-12 PROCEDURE — 91305 HC SARSCOV2 VAC 30 MCG TRS-SUCR PFIZER: CPT | Performed by: INTERNAL MEDICINE

## 2022-03-12 PROCEDURE — 0054A HC ADM SARSCV2 30MCG TRS-SUCR BOOSTER: CPT | Performed by: INTERNAL MEDICINE

## 2022-04-06 RX ORDER — AMLODIPINE BESYLATE 10 MG/1
10 TABLET ORAL DAILY
Qty: 90 TABLET | Refills: 1 | Status: SHIPPED | OUTPATIENT
Start: 2022-04-06 | End: 2022-07-29 | Stop reason: SDUPTHER

## 2022-04-27 ENCOUNTER — TELEPHONE (OUTPATIENT)
Dept: NEUROLOGY | Facility: CLINIC | Age: 67
End: 2022-04-27

## 2022-04-28 ENCOUNTER — HOSPITAL ENCOUNTER (OUTPATIENT)
Dept: INFUSION THERAPY | Facility: HOSPITAL | Age: 67
Discharge: HOME | End: 2022-04-28
Admitting: PSYCHIATRY & NEUROLOGY

## 2022-04-28 ENCOUNTER — TELEPHONE (OUTPATIENT)
Dept: FAMILY MEDICINE CLINIC | Facility: CLINIC | Age: 67
End: 2022-04-28

## 2022-04-28 DIAGNOSIS — R74.8 ELEVATED CK: ICD-10-CM

## 2022-04-28 PROCEDURE — 95886 MUSC TEST DONE W/N TEST COMP: CPT | Performed by: PSYCHIATRY & NEUROLOGY

## 2022-04-28 PROCEDURE — 95909 NRV CNDJ TST 5-6 STUDIES: CPT | Performed by: PSYCHIATRY & NEUROLOGY

## 2022-04-28 PROCEDURE — 95909 NRV CNDJ TST 5-6 STUDIES: CPT

## 2022-04-28 PROCEDURE — 95886 MUSC TEST DONE W/N TEST COMP: CPT

## 2022-04-28 NOTE — TELEPHONE ENCOUNTER
**OK HUB TO READ**       Called pt to schedule annual exam for next available or NP.    Could not reach pt, left VM to call back.

## 2022-05-01 RX ORDER — OLMESARTAN MEDOXOMIL AND HYDROCHLOROTHIAZIDE 40/12.5 40; 12.5 MG/1; MG/1
1 TABLET ORAL DAILY
Qty: 90 TABLET | Refills: 1 | Status: CANCELLED | OUTPATIENT
Start: 2022-05-01

## 2022-05-02 RX ORDER — OLMESARTAN MEDOXOMIL AND HYDROCHLOROTHIAZIDE 40/12.5 40; 12.5 MG/1; MG/1
1 TABLET ORAL DAILY
Qty: 90 TABLET | Refills: 1 | Status: SHIPPED | OUTPATIENT
Start: 2022-05-02 | End: 2022-07-29 | Stop reason: SDUPTHER

## 2022-05-10 ENCOUNTER — TELEPHONE (OUTPATIENT)
Dept: FAMILY MEDICINE CLINIC | Facility: CLINIC | Age: 67
End: 2022-05-10

## 2022-07-20 DIAGNOSIS — E78.2 MIXED HYPERLIPIDEMIA: ICD-10-CM

## 2022-07-20 DIAGNOSIS — I10 ESSENTIAL HYPERTENSION: Primary | ICD-10-CM

## 2022-07-20 DIAGNOSIS — E11.9 TYPE 2 DIABETES MELLITUS WITHOUT COMPLICATION, WITHOUT LONG-TERM CURRENT USE OF INSULIN: ICD-10-CM

## 2022-07-26 ENCOUNTER — LAB (OUTPATIENT)
Dept: LAB | Facility: HOSPITAL | Age: 67
End: 2022-07-26

## 2022-07-26 PROCEDURE — 83036 HEMOGLOBIN GLYCOSYLATED A1C: CPT | Performed by: FAMILY MEDICINE

## 2022-07-26 PROCEDURE — 80061 LIPID PANEL: CPT | Performed by: FAMILY MEDICINE

## 2022-07-26 PROCEDURE — 80053 COMPREHEN METABOLIC PANEL: CPT | Performed by: FAMILY MEDICINE

## 2022-07-29 ENCOUNTER — OFFICE VISIT (OUTPATIENT)
Dept: FAMILY MEDICINE CLINIC | Facility: CLINIC | Age: 67
End: 2022-07-29

## 2022-07-29 VITALS
OXYGEN SATURATION: 98 % | HEIGHT: 73 IN | TEMPERATURE: 97.8 F | DIASTOLIC BLOOD PRESSURE: 82 MMHG | HEART RATE: 70 BPM | BODY MASS INDEX: 34.67 KG/M2 | WEIGHT: 261.6 LBS | SYSTOLIC BLOOD PRESSURE: 120 MMHG

## 2022-07-29 DIAGNOSIS — M79.10 MYALGIA: ICD-10-CM

## 2022-07-29 DIAGNOSIS — R94.131 ABNORMAL EMG: ICD-10-CM

## 2022-07-29 DIAGNOSIS — M15.9 GENERALIZED OA: ICD-10-CM

## 2022-07-29 DIAGNOSIS — E78.2 MIXED HYPERLIPIDEMIA: ICD-10-CM

## 2022-07-29 DIAGNOSIS — E78.1 HYPERTRIGLYCERIDEMIA: ICD-10-CM

## 2022-07-29 DIAGNOSIS — R74.8 ELEVATED CK: ICD-10-CM

## 2022-07-29 DIAGNOSIS — I10 ESSENTIAL HYPERTENSION: Primary | ICD-10-CM

## 2022-07-29 DIAGNOSIS — E11.9 TYPE 2 DIABETES MELLITUS WITHOUT COMPLICATION, WITHOUT LONG-TERM CURRENT USE OF INSULIN: ICD-10-CM

## 2022-07-29 DIAGNOSIS — Q82.8 KERATOSIS OF PLANTAR ASPECT OF FOOT: ICD-10-CM

## 2022-07-29 PROBLEM — R42 LIGHTHEADED: Status: RESOLVED | Noted: 2020-08-28 | Resolved: 2022-07-29

## 2022-07-29 PROCEDURE — 99215 OFFICE O/P EST HI 40 MIN: CPT | Performed by: FAMILY MEDICINE

## 2022-07-29 RX ORDER — OLMESARTAN MEDOXOMIL AND HYDROCHLOROTHIAZIDE 40/12.5 40; 12.5 MG/1; MG/1
1 TABLET ORAL DAILY
Qty: 90 TABLET | Refills: 1 | Status: SHIPPED | OUTPATIENT
Start: 2022-07-29 | End: 2023-01-25 | Stop reason: SDUPTHER

## 2022-07-29 RX ORDER — GEMFIBROZIL 600 MG/1
600 TABLET, FILM COATED ORAL
COMMUNITY
End: 2022-07-29 | Stop reason: SDUPTHER

## 2022-07-29 RX ORDER — AMLODIPINE BESYLATE 10 MG/1
10 TABLET ORAL DAILY
Qty: 90 TABLET | Refills: 1 | Status: SHIPPED | OUTPATIENT
Start: 2022-07-29

## 2022-07-29 RX ORDER — GEMFIBROZIL 600 MG/1
600 TABLET, FILM COATED ORAL
Qty: 180 TABLET | Refills: 1 | Status: SHIPPED | OUTPATIENT
Start: 2022-07-29 | End: 2022-11-11

## 2022-07-29 RX ORDER — MELOXICAM 15 MG/1
15 TABLET ORAL DAILY
Qty: 30 TABLET | Refills: 2 | Status: SHIPPED | OUTPATIENT
Start: 2022-07-29

## 2022-07-29 NOTE — PATIENT INSTRUCTIONS
Continue current treatment plan.     To podiatrist    Needs to follow-up with neurologist/Dr. Hale    Get lab work few days prior to next visit

## 2022-07-29 NOTE — PROGRESS NOTES
"Chief Complaint  Hypertension (6 months check up had labs already) and Foot Problem (Possible corn or wart on bottom side of left foot hurts to walk on)     Greater than 6 months follow-up for elevated CK, myalgias, HTN, IGT, OA, and complaints of \"something\" on the left side of his foot    LOV with me last October 2021 for wellness exam and persistently uncontrolled CK levels with mild myalgias.  -- Screening test updated  -- Due to rising CK levels (greater than 400) and family history significant for ALS referral was made to neurologist.  He had been off all statin treatment and Zetia for greater than 1 year.  Rheumatologic work-up was negative in 2020 despite a positive NASIM.  -- It was also recommended that he stop taking the Lopid at last visit in October 2021.  -- Recommendations were to follow-up in 3 to 4 months, but apparently appointments had to be rescheduled and this was the soonest he could get an appointment.    Overall, doing better.  His myalgias are about 50% improved and really do not interfere with his quality of life.  Working part-time, approximately 15/h/week with [employer].  He decided to restart his Lopid but only 1 a day approximately 3 to 4 months ago.  Continues to try to maintain a low-cholesterol diet and regular cardio exercise although had to decrease his exercise a few weeks ago due to a new problem with his left foot.  Weight gain of 10 pounds noted.    He did see neurologist/Dr. Hale in consultation in January 2022.  Records reviewed.  There was no concern for any underlying neurological disorder, physical exam findings all WNL.  It was suspected his elevated CK levels were residual from prior statin treatment over 1 year ago.  Although, Dr. Hale did order an EMG/NCV study.  Apparently he had this done yet never followed up on it.  Denies back pain, lower extremity weakness.  Very mild residual myalgias remain.    His only complaint today is --- this area on the bottom of his left " "foot by his fifth toe.  He describes some type of hard callus-like formation on the edge of his fifth digit, a wart?  Or corn?  It has been present for a few weeks and is making it more difficult to exercise, becoming more painful and tender.    Otherwise, needs chronic med refills today.  Requesting 90-day supplies.  Had fasting lab work done last week, here for review.  Compliant with and tolerates all medications without side effects, except for statins and Zetia.    Review of Systems   Constitutional: Negative for fever and unexpected weight change.   Respiratory: Negative for cough and shortness of breath.    Cardiovascular: Negative for chest pain.        Subjective          Simone Kyle presents to [facility]    Objective   Vital Signs:   Vitals:    07/29/22 1405   BP: 120/82   BP Location: Left arm   Patient Position: Sitting   Cuff Size: Adult   Pulse: 70   Temp: 97.8 °F (36.6 °C)   SpO2: 98%   Weight: 119 kg (261 lb 9.6 oz)   Height: 185.4 cm (73\")      Physical Exam  Vitals and nursing note reviewed.   Constitutional:       Appearance: Normal appearance. He is well-developed.   HENT:      Head: Normocephalic and atraumatic.      Nose: Nose normal.   Eyes:      Conjunctiva/sclera: Conjunctivae normal.      Pupils: Pupils are equal, round, and reactive to light.   Neck:      Thyroid: No thyromegaly.   Cardiovascular:      Rate and Rhythm: Normal rate and regular rhythm.      Heart sounds: Normal heart sounds. No murmur heard.  Pulmonary:      Effort: Pulmonary effort is normal.      Breath sounds: Normal breath sounds.   Abdominal:      General: Abdomen is flat. Bowel sounds are normal. There is no distension.      Palpations: Abdomen is soft. There is no hepatomegaly, splenomegaly or mass.      Tenderness: There is no abdominal tenderness. There is no guarding or rebound.      Hernia: No hernia is present.   Musculoskeletal:         General: Normal range of motion.      " Cervical back: Normal range of motion and neck supple.      Right lower leg: No edema.      Left lower leg: No edema.   Feet:      Comments: Left foot/fifth toe plantar aspect --hard callus-like formation, core like  Lymphadenopathy:      Cervical: No cervical adenopathy.   Skin:     General: Skin is warm.   Neurological:      General: No focal deficit present.      Mental Status: He is alert.   Psychiatric:         Mood and Affect: Mood normal.         Behavior: Behavior normal.         Thought Content: Thought content normal.         Judgment: Judgment normal.        Result Review :     Common labs    Common Labsle 9/24/21 9/24/21 10/1/21 7/26/22 7/26/22 7/26/22    0819 0819  0746 0746 0746   Glucose 102 (A)     107 (A)   BUN 23     19   Creatinine 0.76     0.94   eGFR Non  Am 103        eGFR African Am 124        Sodium 143     143   Potassium 4.1     4.5   Chloride 105     105   Calcium 9.0     9.0   Total Protein 6.5        Albumin 4.50     4.20   Total Bilirubin 0.5     0.4   Alkaline Phosphatase 59     59   AST (SGOT) 26     24   ALT (SGPT) 28     28   Total Cholesterol     265 (A)    Total Cholesterol  267 (A)       Triglycerides  92   293 (A)    HDL Cholesterol  57   41    LDL Cholesterol   194 (A)   168 (A)    Hemoglobin A1C    6.10 (A)     PSA   1.590      (A) Abnormal value       Comments are available for some flowsheets but are not being displayed.               A1C Last 3 Results    HGBA1C Last 3 Results 7/26/22   Hemoglobin A1C 6.10 (A)   (A) Abnormal value            September 2021 --- CK level 471        Lab Results   Component Value Date    ANADIRECT Positive (A) 08/28/2020      EMG & Nerve Conduction Test (04/28/2022)           Assessment and Plan    Diagnoses and all orders for this visit:    1. Essential hypertension (Primary)  -controlled  Continue/refill Norvasc 10 mg daily  Continue Benicar HCTZ 40/12.5 mg daily    2. Mixed hyperlipidemia  -uncontrolled  Intolerant to statins and  Zetia due to myalgias and elevated CK.  Check TSH today  Will plan to continue Lopid at 600 mg 1 p.o. daily provided CK levels are not rising.  -     TSH  -     Comprehensive Metabolic Panel; Future  -     Lipid Panel With LDL / HDL Ratio; Future    3. Generalized OA  -controlled  Renal function remains normal  May continue Mobic 15 mg 1 p.o. daily as needed    4. Type 2 diabetes mellitus without complication, without long-term current use of insulin (HCC)  -diet controlled, A1c 6.2  Needs low-carb/low calorie/low cholesterol diet and increase cardio exercise to greater than 150 minutes weekly    5. Hypertriglyceridemia  --uncontrolled  Needs low-carb/low calorie/low cholesterol diet and increase cardio exercise to greater than 150 minutes weekly  If not improved may need to consider Vescepa    6. Myalgia  -improved  Likely secondary to past statin use    7. Elevated CK  -recheck CK level  Hopefully improved given off statins and Zetia for greater than 1 year  Seen neurologist/Dr. Hale, no neurological disorder suspected yet needs follow-up on EMG/NCV study.  -     CK  -     CK; Future    8. Abnormal EMG  -with nonspecific findings of a left peripheral neuropathy?  Bilateral S1 radiculopathy yet paraspinal muscles no abnormality?  Recommend him following up with his neurologist/Dr. Hale who ordered the test.  Denies back pain, nonspecific findings    9. Keratosis of plantar aspect of foot  -new finding, suspect IPK?  -     Ambulatory Referral to Podiatry    Other orders  -     amLODIPine (NORVASC) 10 MG tablet; Take 1 tablet by mouth Daily.  Dispense: 90 tablet; Refill: 1  -     gemfibrozil (LOPID) 600 MG tablet; Take 1 tablet by mouth 2 (Two) Times a Day Before Meals.  Dispense: 180 tablet; Refill: 1  -     olmesartan-hydrochlorothiazide (BENICAR HCT) 40-12.5 MG per tablet; Take 1 tablet by mouth Daily.  Dispense: 90 tablet; Refill: 1  -     meloxicam (MOBIC) 15 MG tablet; Take 1 tablet by mouth Daily.  Dispense:  30 tablet; Refill: 2      I spent 40 minutes caring for Simone on this date of service. This time includes time spent by me in the following activities:preparing for the visit, reviewing tests, obtaining and/or reviewing a separately obtained history, performing a medically appropriate examination and/or evaluation , counseling and educating the patient/family/caregiver, ordering medications, tests, or procedures, documenting information in the medical record, independently interpreting results and communicating that information with the patient/family/caregiver, care coordination and Office notes from almost 1 year ago reviewed, neurology consultation reviewed, new and uncontrolled problems addressed.  Extensive counseling.  Follow Up   Return in about 3 months (around 10/29/2022) for Medicare Wellness, given the hospital follow-up spot.  Patient was given instructions and counseling regarding his condition or for health maintenance advice. Please see specific information pulled into the AVS if appropriate.

## 2022-07-30 LAB
CK SERPL-CCNC: 338 U/L (ref 41–331)
TSH SERPL DL<=0.005 MIU/L-ACNC: 1.19 UIU/ML (ref 0.45–4.5)

## 2022-10-17 ENCOUNTER — TELEPHONE (OUTPATIENT)
Dept: NEUROLOGY | Facility: CLINIC | Age: 67
End: 2022-10-17

## 2022-11-10 ENCOUNTER — LAB (OUTPATIENT)
Dept: LAB | Facility: HOSPITAL | Age: 67
End: 2022-11-10

## 2022-11-10 PROCEDURE — 82550 ASSAY OF CK (CPK): CPT | Performed by: FAMILY MEDICINE

## 2022-11-10 PROCEDURE — 80061 LIPID PANEL: CPT | Performed by: FAMILY MEDICINE

## 2022-11-10 PROCEDURE — 80053 COMPREHEN METABOLIC PANEL: CPT | Performed by: FAMILY MEDICINE

## 2022-11-11 ENCOUNTER — OFFICE VISIT (OUTPATIENT)
Dept: FAMILY MEDICINE CLINIC | Facility: CLINIC | Age: 67
End: 2022-11-11

## 2022-11-11 VITALS
SYSTOLIC BLOOD PRESSURE: 128 MMHG | BODY MASS INDEX: 35.47 KG/M2 | HEART RATE: 72 BPM | HEIGHT: 73 IN | DIASTOLIC BLOOD PRESSURE: 84 MMHG | TEMPERATURE: 97.7 F | WEIGHT: 267.6 LBS | OXYGEN SATURATION: 98 %

## 2022-11-11 DIAGNOSIS — Z00.00 ENCOUNTER FOR MEDICARE ANNUAL WELLNESS EXAM: Primary | ICD-10-CM

## 2022-11-11 DIAGNOSIS — R74.8 ELEVATED CK: ICD-10-CM

## 2022-11-11 DIAGNOSIS — R63.5 WEIGHT GAIN: ICD-10-CM

## 2022-11-11 DIAGNOSIS — E11.9 TYPE 2 DIABETES MELLITUS WITHOUT COMPLICATION, WITHOUT LONG-TERM CURRENT USE OF INSULIN: ICD-10-CM

## 2022-11-11 DIAGNOSIS — Z82.49 FAMILY HISTORY OF ISCHEMIC HEART DISEASE: ICD-10-CM

## 2022-11-11 DIAGNOSIS — E78.2 MIXED HYPERLIPIDEMIA: ICD-10-CM

## 2022-11-11 DIAGNOSIS — R94.131 ABNORMAL EMG: ICD-10-CM

## 2022-11-11 DIAGNOSIS — Z12.5 ENCOUNTER FOR PROSTATE CANCER SCREENING: ICD-10-CM

## 2022-11-11 DIAGNOSIS — I10 ESSENTIAL HYPERTENSION: ICD-10-CM

## 2022-11-11 DIAGNOSIS — Z23 NEED FOR PNEUMOCOCCAL VACCINATION: ICD-10-CM

## 2022-11-11 PROCEDURE — 90677 PCV20 VACCINE IM: CPT | Performed by: FAMILY MEDICINE

## 2022-11-11 PROCEDURE — 1159F MED LIST DOCD IN RCRD: CPT | Performed by: FAMILY MEDICINE

## 2022-11-11 PROCEDURE — 1170F FXNL STATUS ASSESSED: CPT | Performed by: FAMILY MEDICINE

## 2022-11-11 PROCEDURE — 99214 OFFICE O/P EST MOD 30 MIN: CPT | Performed by: FAMILY MEDICINE

## 2022-11-11 PROCEDURE — G0402 INITIAL PREVENTIVE EXAM: HCPCS | Performed by: FAMILY MEDICINE

## 2022-11-11 PROCEDURE — G0009 ADMIN PNEUMOCOCCAL VACCINE: HCPCS | Performed by: FAMILY MEDICINE

## 2022-11-11 NOTE — PATIENT INSTRUCTIONS
Stop gemfibrozil    Follow-up with Dr. Hale regarding EMG and elevated CK levels    Needs to get Tdap, shingles vaccine, COVID booster from pharmacy    Plan to get cardiac CT score for risk stratification and further recommendations    Weight loss needed --Recommend low fat/low calorie diet and exercise greater than 150 minutes of cardio per week.

## 2022-11-11 NOTE — PROGRESS NOTES
The ABCs of the Annual Wellness Visit  Subsequent Medicare Wellness Visit    Chief Complaint   Patient presents with   • Medicare Wellness-subsequent     YEARLY WELLNESS LAST COLOGUARD 2020 LABS COMPLETED   • Hypertension   • Hyperlipidemia   • Elevated CK     NEVER COULD GET IN TOUCH WITH DR OTOOLE REGARDING EMG      Subjective    History of Present Illness:  Simone Kyle is a 67 y.o. male who presents for a Subsequent Medicare Wellness Visit.    PRESENTS FOR ANNUAL WELLNESS EXAM  And  3-month follow-up on uncontrolled hyperlipidemia and persistent elevation of CK enzyme, HTN, and diet-controlled DM    LOV with me in July for uncontrolled lipids, persistent elevation of CK, and abnormal EMG  -- Neurology consultation from Dr. Hale reviewed with patient, sent for an EMG in April with abnormal results but no follow-up.  Thus, strongly urged him to follow back up with Dr. Hale to review the EMG and follow-up on the persistent CK elevation.  Apparently, he called their office but the message was forwarded to Dr. Kimani Otoole (physician who did the EMG) but not to Dr. Robert Hale who actually ordered the test.  He never did make an appointment to follow back up.  Neurology consultation from 1/28/2022 reviewed.  -- Due to a slightly improved CK level, resolved myalgias, and uncontrolled lipids his gemfibrozil was increased to twice daily.  -- Also referred to podiatrist for an IPK.    Overall, doing okay.  Still working part-time, 3 days/week in the hospital setting.  Actually has been feeling much better in terms of the myalgias.  Generally not bothered by any muscle aches, just occasional joint arthralgias.  Admits to a poor diet during recent months.  Blames this on Halloween.  He has gained 15 pounds in the last 3 months.  Denies chest pain, SOA, abdominal pain.    No new complaints or concerns today.  Had fasting lab work done 2 days ago, needs to be reviewed.  Compliant with and tolerates all medications without  side effects, except for statins and Zetia.    Routine health maintenance/screening test:  Prostate Screening --- 2021, negative  Colorectal Screen --- Cologuard done in August 2020, negative  Vaccines --- due for several vaccinations  Smoking/ETOH Status --- non-smoker, no alcohol use  Dentist, Eye Exam, Derm --- maintains regular dental visits, eye exam, no dermatologist  Diet/Exercise --- tries to maintain a low-cholesterol diet, fair on diet.  Exercises sporadic  Pertinent FH --- significant for ALS/mother, CVA/father, CAD/father, negative for colon cancer, prostate cancer    The following portions of the patient's history were reviewed and   updated as appropriate: allergies, current medications, past family history, past medical history, past social history, past surgical history and problem list.    Compared to one year ago, the patient feels his physical   health is the same.    Compared to one year ago, the patient feels his mental   health is the same.    Recent Hospitalizations:  He was not admitted to the hospital during the last year.       Current Medical Providers:  Patient Care Team:  Diana Bennett MD as PCP - General (Family Medicine)    Outpatient Medications Prior to Visit   Medication Sig Dispense Refill   • amLODIPine (NORVASC) 10 MG tablet Take 1 tablet by mouth Daily. 90 tablet 1   • fluticasone (Flonase) 50 MCG/ACT nasal spray 2 sprays into the nostril(s) as directed by provider Daily. 16 g 0   • meloxicam (MOBIC) 15 MG tablet Take 1 tablet by mouth Daily. 30 tablet 2   • olmesartan-hydrochlorothiazide (BENICAR HCT) 40-12.5 MG per tablet Take 1 tablet by mouth Daily. 90 tablet 1   • gemfibrozil (LOPID) 600 MG tablet Take 1 tablet by mouth 2 (Two) Times a Day Before Meals. 180 tablet 1     No facility-administered medications prior to visit.       No opioid medication identified on active medication list. I have reviewed chart for other potential  high risk medication/s and harmful drug  "interactions in the elderly.          Aspirin is not on active medication list.  Aspirin use is not indicated based on review of current medical condition/s. Risk of harm outweighs potential benefits.  .    Patient Active Problem List   Diagnosis   • Essential hypertension   • Mixed hyperlipidemia   • Generalized OA   • Type 2 diabetes mellitus without complication, without long-term current use of insulin (HCC)   • Myalgia   • Elevated CK   • Statin intolerance   • Abnormal EMG   • Keratosis of plantar aspect of foot   • Hypertriglyceridemia   • Family history of ischemic heart disease     Advance Care Planning  Advance Directive is not on file.  ACP discussion was held with the patient during this visit. Patient does not have an advance directive, information provided.    Review of Systems   Constitutional: Negative for fever.   Respiratory: Negative for cough and shortness of breath.    Cardiovascular: Negative for chest pain.        Objective    Vitals:    11/11/22 1315   BP: 128/84   BP Location: Left arm   Patient Position: Sitting   Cuff Size: Adult   Pulse: 72   Temp: 97.7 °F (36.5 °C)   SpO2: 98%   Weight: 121 kg (267 lb 9.6 oz)   Height: 185.4 cm (73\")     BMI Readings from Last 1 Encounters:   11/11/22 35.31 kg/m²   BMI is above normal parameters. Recommendations include: exercise counseling and nutrition counseling    Does the patient have evidence of cognitive impairment? No    Physical Exam  Vitals and nursing note reviewed.   Constitutional:       Appearance: Normal appearance. He is well-developed.   HENT:      Head: Normocephalic and atraumatic.      Nose: Nose normal.   Eyes:      Conjunctiva/sclera: Conjunctivae normal.      Pupils: Pupils are equal, round, and reactive to light.   Neck:      Thyroid: No thyromegaly.   Cardiovascular:      Rate and Rhythm: Normal rate and regular rhythm.      Heart sounds: Normal heart sounds. No murmur heard.  Pulmonary:      Effort: Pulmonary effort is normal. "      Breath sounds: Normal breath sounds.   Abdominal:      General: Abdomen is flat. Bowel sounds are normal. There is no distension.      Palpations: Abdomen is soft. There is no hepatomegaly, splenomegaly or mass.      Tenderness: There is no abdominal tenderness. There is no guarding or rebound.      Hernia: No hernia is present.   Musculoskeletal:         General: Normal range of motion.      Cervical back: Normal range of motion and neck supple.      Right lower leg: No edema.      Left lower leg: No edema.   Lymphadenopathy:      Cervical: No cervical adenopathy.   Skin:     General: Skin is warm.      Comments: No dysplastic or abnormal lesions   Neurological:      General: No focal deficit present.      Mental Status: He is alert.   Psychiatric:         Mood and Affect: Mood normal.         Behavior: Behavior normal.         Thought Content: Thought content normal.         Judgment: Judgment normal.       Common labs    Common Labs 7/26/22 7/26/22 7/26/22 11/10/22 11/10/22    0746 0746 0746 0708 0708   Glucose   107 (A)  112 (A)   BUN   19  15   Creatinine   0.94  0.98   Sodium   143  140   Potassium   4.5  4.9   Chloride   105  104   Calcium   9.0  9.3   Albumin   4.20  4.30   Total Bilirubin   0.4  0.4   Alkaline Phosphatase   59  60   AST (SGOT)   24  27   ALT (SGPT)   28  31   Total Cholesterol  265 (A)  258 (A)    Triglycerides  293 (A)  103    HDL Cholesterol  41  57    LDL Cholesterol   168 (A)  183 (A)    Hemoglobin A1C 6.10 (A)       (A) Abnormal value       Comments are available for some flowsheets but are not being displayed.           TSH    TSH 7/29/22   TSH 1.190               Lab Results   Component Value Date    ANADIRECT Positive (A) 08/28/2020      EMG & Nerve Conduction Test (04/28/2022)    November 2022 --CK level 332 (decreased from 338 in July 2022)         HEALTH RISK ASSESSMENT    Smoking Status:  Social History     Tobacco Use   Smoking Status Never   Smokeless Tobacco Never      Alcohol Consumption:  Social History     Substance and Sexual Activity   Alcohol Use Yes   • Alcohol/week: 6.0 - 7.0 standard drinks   • Types: 6 - 7 Cans of beer per week    Comment: OCCASIONAL     Fall Risk Screen:    KASANDRA Fall Risk Assessment was completed, and patient is at LOW risk for falls.Assessment completed on:1/28/2022    Depression Screening:  PHQ-2/PHQ-9 Depression Screening 7/29/2022   Little Interest or Pleasure in Doing Things 0-->not at all   Feeling Down, Depressed or Hopeless 0-->not at all   PHQ-9: Brief Depression Severity Measure Score 0       Health Habits and Functional and Cognitive Screening:  No flowsheet data found.    Age-appropriate Screening Schedule:  Refer to the list below for future screening recommendations based on patient's age, sex and/or medical conditions. Orders for these recommended tests are listed in the plan section. The patient has been provided with a written plan.    Health Maintenance   Topic Date Due   • URINE MICROALBUMIN  Never done   • TDAP/TD VACCINES (1 - Tdap) Never done   • ZOSTER VACCINE (1 of 2) Never done   • DIABETIC FOOT EXAM  Never done   • DIABETIC EYE EXAM  Never done   • INFLUENZA VACCINE  Never done   • HEMOGLOBIN A1C  01/26/2023   • LIPID PANEL  11/10/2023              Assessment & Plan   CMS Preventative Services Quick Reference  Risk Factors Identified During Encounter  Immunizations Discussed/Encouraged (specific Immunizations; Influenza, Prevnar 20 (Pneumococcal 20-valent conjugate), Shingrix and COVID19  Obesity/Overweight   The above risks/problems have been discussed with the patient.  Follow up actions/plans if indicated are seen below in the Assessment/Plan Section.  Pertinent information has been shared with the patient in the After Visit Summary.    Diagnoses and all orders for this visit:    1. Encounter for Medicare annual wellness exam (Primary)  -S/P subsequent [facility] exam done, WNL except for BMI of 35.3  Needed screening today  includes: PSA, CBC, cardiac screening, pneumococcal 20 vaccine, shingles vaccine, Tdap, and COVID booster (plans to obtain these 3 from pharmacy in near future.)  See orders below.  Have discussed and provided information regarding a Living Will.  Otherwise, weight loss needed --Needs low-carb/low calorie/low cholesterol diet and increase cardio exercise to greater than 150 minutes weekly  -     CBC & Differential    2. Encounter for prostate cancer screening  -     PSA Screen    3. Family history of ischemic heart disease  -     CT Cardiac Calcium Score Without Dye; Future    4. Elevated CK  -remains uncontrolled, although improved.  S/P neurology consultation done in January 2022 by Dr. Robert Hale, records reviewed again.  Recommendations were to obtain an EMG and then follow back up.  Again, stressed the importance THAT HE SCHEDULES A FOLLOW-UP APPOINTMENT, not a telephone call with Dr. Hale.  Neurology exam within normal limits, CK level is slowly declining since he has been off statins and Zetia.  Relatively asymptomatic.  Yet, abnormal EMG.  Again needs follow-up with neurologist.  Plan to stop Lopid, not helping with the lipids anyhow.  -     CK; Future    5. Abnormal EMG  -new finding.  Needs to follow-up with neurologist/Dr. Hale, not Dr. Otoole (who did the EMG.)  Needs to follow-up with the neurologist who he saw in consultation and who ordered the study.    6. Mixed hyperlipidemia  -remains uncontrolled  Failed statins, Zetia, Lopid.  Intolerant to statins due to elevated CK and myalgias.  Check CT cardiac calcium score, if with mild nonobstructive CAD may possibly be able to get Repatha approved?  Needs low-carb/low calorie/low cholesterol diet and increase cardio exercise to greater than 150 minutes weekly  -     TSH  -     CT Cardiac Calcium Score Without Dye; Future  -     Lipid Panel With LDL / HDL Ratio; Future    7. Weight gain  -new Dx.  Stressed the importance of needed weight loss,  especially in terms of his lipids, BP, and DM  Plan to check TSH    8. Essential hypertension  -slightly uncontrolled  Given DM, even though diabetic controlled would like tighter blood pressure control.  Most likely this is due to recent weight gain.  Stressed the importance of needed weight loss otherwise we will need to add a third medication.  Both Norvasc and Benicar are at maximum doses, continue as prescribed  -     CT Cardiac Calcium Score Without Dye; Future  -     Basic Metabolic Panel; Future    9. Type 2 diabetes mellitus without complication, without long-term current use of insulin (HCC)  -diet controlled?  Recheck A1c  -     Hemoglobin A1c    10. Need for pneumococcal vaccination  -     Pneumococcal Conjugate Vaccine 20-Valent (PCV20)    In addition to wellness exam, multiple separate and identifiable uncontrolled diagnoses were addressed at this visit as well--- uncontrolled hyperlipidemia, elevated CK, HTN, and weight gain, and abnormal EMG findings    Follow Up:   Return in about 3 months (around 2/11/2023) for Recheck labs prior to follow-up appointment in 3 months.     An After Visit Summary and PPPS were made available to the patient.

## 2022-11-12 LAB
BASOPHILS # BLD AUTO: 0.02 10*3/MM3 (ref 0–0.2)
BASOPHILS NFR BLD AUTO: 0.4 % (ref 0–1.5)
EOSINOPHIL # BLD AUTO: 0.15 10*3/MM3 (ref 0–0.4)
EOSINOPHIL NFR BLD AUTO: 2.6 % (ref 0.3–6.2)
ERYTHROCYTE [DISTWIDTH] IN BLOOD BY AUTOMATED COUNT: 13 % (ref 12.3–15.4)
HBA1C MFR BLD: 6 % (ref 4.8–5.6)
HCT VFR BLD AUTO: 45.2 % (ref 37.5–51)
HGB BLD-MCNC: 15.1 G/DL (ref 13–17.7)
IMM GRANULOCYTES # BLD AUTO: 0.02 10*3/MM3 (ref 0–0.05)
IMM GRANULOCYTES NFR BLD AUTO: 0.4 % (ref 0–0.5)
LYMPHOCYTES # BLD AUTO: 1.4 10*3/MM3 (ref 0.7–3.1)
LYMPHOCYTES NFR BLD AUTO: 24.5 % (ref 19.6–45.3)
MCH RBC QN AUTO: 30.9 PG (ref 26.6–33)
MCHC RBC AUTO-ENTMCNC: 33.4 G/DL (ref 31.5–35.7)
MCV RBC AUTO: 92.6 FL (ref 79–97)
MONOCYTES # BLD AUTO: 0.59 10*3/MM3 (ref 0.1–0.9)
MONOCYTES NFR BLD AUTO: 10.3 % (ref 5–12)
NEUTROPHILS # BLD AUTO: 3.53 10*3/MM3 (ref 1.7–7)
NEUTROPHILS NFR BLD AUTO: 61.8 % (ref 42.7–76)
NRBC BLD AUTO-RTO: 0 /100 WBC (ref 0–0.2)
PLATELET # BLD AUTO: 166 10*3/MM3 (ref 140–450)
PSA SERPL-MCNC: 1.51 NG/ML (ref 0–4)
RBC # BLD AUTO: 4.88 10*6/MM3 (ref 4.14–5.8)
TSH SERPL DL<=0.005 MIU/L-ACNC: 1.45 UIU/ML (ref 0.27–4.2)
WBC # BLD AUTO: 5.71 10*3/MM3 (ref 3.4–10.8)

## 2022-11-16 ENCOUNTER — TELEPHONE (OUTPATIENT)
Dept: NEUROLOGY | Facility: CLINIC | Age: 67
End: 2022-11-16

## 2022-11-16 NOTE — TELEPHONE ENCOUNTER
PATIENT CALLING FOR EMG TEST RESULTS FROM 4/2022.    PLEASE CALL TO DISCUSS WITH PATIENT.    THANK YOU

## 2022-12-14 ENCOUNTER — TELEPHONE (OUTPATIENT)
Dept: NEUROLOGY | Facility: CLINIC | Age: 67
End: 2022-12-14

## 2022-12-14 NOTE — TELEPHONE ENCOUNTER
PATIENT CALLED TO SCHEDULE FOLLOW UP REGARDING HIS EMG RESULTS PER DR MILLIGAN.    SCHEDULED FOR 1ST AVAILABLE 11/28/23    ANY SOONER APPT?    PLEASE ADVISE PATIENT.    THANK YOU

## 2022-12-21 ENCOUNTER — OFFICE VISIT (OUTPATIENT)
Dept: NEUROLOGY | Facility: CLINIC | Age: 67
End: 2022-12-21

## 2022-12-21 VITALS
BODY MASS INDEX: 35.52 KG/M2 | DIASTOLIC BLOOD PRESSURE: 90 MMHG | HEIGHT: 73 IN | OXYGEN SATURATION: 95 % | HEART RATE: 64 BPM | SYSTOLIC BLOOD PRESSURE: 132 MMHG | WEIGHT: 268 LBS

## 2022-12-21 DIAGNOSIS — M54.17 LUMBOSACRAL RADICULOPATHY: ICD-10-CM

## 2022-12-21 DIAGNOSIS — R74.8 ELEVATED CK: Primary | ICD-10-CM

## 2022-12-21 PROCEDURE — 99213 OFFICE O/P EST LOW 20 MIN: CPT | Performed by: PSYCHIATRY & NEUROLOGY

## 2022-12-21 RX ORDER — GEMFIBROZIL 600 MG/1
600 TABLET, FILM COATED ORAL
COMMUNITY
End: 2023-02-17 | Stop reason: SDUPTHER

## 2022-12-21 NOTE — PROGRESS NOTES
Chief Complaint   Patient presents with   • EMG Results       Patient ID: Simone Kyle is a 67 y.o. male.    HPI: I had the pleasure of seeing your patient again today.  As you may know he is a 67-year-old gentleman here for management slight elevation of CK level.  We did schedule him for an EMG/nerve conduction study.  There were no evidence of a myopathic or significant neuropathic process.  There was question of an S1 radiculopathy bilaterally.  No evidence to suggest ALS.  He denies any changes in gait.  No weakness of his arms or legs.  No weakness of the hands.  No change in  strength.    The following portions of the patient's history were reviewed and updated as appropriate: allergies, current medications, past family history, past medical history, past social history, past surgical history and problem list.    Review of Systems   Neurological: Negative for dizziness, tremors, seizures, syncope, speech difficulty, weakness, light-headedness, numbness and headaches.   Hematological: Bruises/bleeds easily.   Psychiatric/Behavioral: Negative for agitation, behavioral problems, confusion, decreased concentration, hallucinations, self-injury, sleep disturbance and suicidal ideas. The patient is not nervous/anxious.       I have reviewed the review of systems above performed by my medical assistant.      Vitals:    12/21/22 1014   BP: 132/90   Pulse: 64   SpO2: 95%       Neurologic Exam     Mental Status   Oriented to person, place, and time.   Concentration: normal.   Level of consciousness: alert  Knowledge: consistent with education (No deficits found.).     Cranial Nerves     CN II   Visual fields full to confrontation.     CN III, IV, VI   Pupils are equal, round, and reactive to light.  Extraocular motions are normal.   CN III: no CN III palsy  CN VI: no CN VI palsy    CN V   Facial sensation intact.     CN VII   Facial expression full, symmetric.     CN VIII   CN VIII normal.     CN IX, X   CN IX  normal.   CN X normal.     CN XI   CN XI normal.     CN XII   CN XII normal.     Motor Exam     Strength   Right neck flexion: 5/5  Left neck flexion: 5/5  Right neck extension: 5/5  Left neck extension: 5/5  Right deltoid: 5/5  Left deltoid: 5/5  Right biceps: 5/5  Left biceps: 5/5  Right triceps: 5/5  Left triceps: 5/5  Right wrist flexion: 5/5  Left wrist flexion: 5/5  Right wrist extension: 5/5  Left wrist extension: 5/5  Right interossei: 5/5  Left interossei: 5/5  Right abdominals: 5/5  Left abdominals: 5/5  Right iliopsoas: 5/5  Left iliopsoas: 5/5  Right quadriceps: 5/5  Left quadriceps: 5/5  Right hamstrin/5  Left hamstrin/5  Right glutei: 5/5  Left glutei: 5/5  Right anterior tibial: 5/5  Left anterior tibial: 5/5  Right posterior tibial: 5/5  Left posterior tibial: 5/5  Right peroneal: 5/5  Left peroneal: 5/5  Right gastroc: 5/5  Left gastroc: 5/5    Sensory Exam   Light touch normal.   Vibration normal.     Gait, Coordination, and Reflexes     Gait  Gait: normal    Reflexes   Right brachioradialis: 2+  Left brachioradialis: 2+  Right biceps: 2+  Left biceps: 2+  Right triceps: 2+  Left triceps: 2+  Right patellar: 2+  Left patellar: 2+  Right achilles: 2+  Left achilles: 2+  Right : 2+  Left : 2+Station is normal.       Physical Exam  Vitals reviewed.   Constitutional:       Appearance: He is well-developed.   HENT:      Head: Normocephalic and atraumatic.   Eyes:      Extraocular Movements: EOM normal.      Pupils: Pupils are equal, round, and reactive to light.   Cardiovascular:      Rate and Rhythm: Normal rate and regular rhythm.   Pulmonary:      Breath sounds: Normal breath sounds.   Musculoskeletal:         General: Normal range of motion.   Skin:     General: Skin is warm.   Neurological:      Mental Status: He is oriented to person, place, and time.      Gait: Gait is intact.      Deep Tendon Reflexes:      Reflex Scores:       Tricep reflexes are 2+ on the right side and 2+ on  the left side.       Bicep reflexes are 2+ on the right side and 2+ on the left side.       Brachioradialis reflexes are 2+ on the right side and 2+ on the left side.       Patellar reflexes are 2+ on the right side and 2+ on the left side.       Achilles reflexes are 2+ on the right side and 2+ on the left side.        Procedures    Assessment/Plan: We are going to at this point see him on an as-needed basis.  I do not think he has ALS or any other myelopathic or severe neuropathic process.  He does have a history of chronic lower back pain.  I suggested if that were to worsen he could contact us for referral for physical therapy versus a follow-up MRI of the lumbosacral spine.  A total of 25 minutes was spent face-to-face with patient today.  Of that greater than 50% of this time was spent discussing signs and symptoms of elevated CK, lumbosacral radiculopathy, patient education, plan of care and prognosis.       Diagnoses and all orders for this visit:    1. Elevated CK (Primary)    2. Lumbosacral radiculopathy           Robert Hale II, MD

## 2022-12-29 ENCOUNTER — HOSPITAL ENCOUNTER (OUTPATIENT)
Dept: CT IMAGING | Facility: HOSPITAL | Age: 67
Discharge: HOME | End: 2022-12-29
Admitting: FAMILY MEDICINE
Payer: MEDICARE

## 2022-12-29 DIAGNOSIS — I10 ESSENTIAL HYPERTENSION: ICD-10-CM

## 2022-12-29 DIAGNOSIS — Z82.49 FAMILY HISTORY OF ISCHEMIC HEART DISEASE: ICD-10-CM

## 2022-12-29 DIAGNOSIS — E78.2 MIXED HYPERLIPIDEMIA: ICD-10-CM

## 2022-12-29 PROCEDURE — 75571 CT HRT W/O DYE W/CA TEST: CPT

## 2023-01-25 RX ORDER — OLMESARTAN MEDOXOMIL AND HYDROCHLOROTHIAZIDE 40/12.5 40; 12.5 MG/1; MG/1
1 TABLET ORAL DAILY
Qty: 90 TABLET | Refills: 1 | Status: SHIPPED | OUTPATIENT
Start: 2023-01-25

## 2023-02-16 ENCOUNTER — LAB (OUTPATIENT)
Dept: LAB | Facility: HOSPITAL | Age: 68
End: 2023-02-16
Payer: MEDICARE

## 2023-02-16 PROCEDURE — 82550 ASSAY OF CK (CPK): CPT | Performed by: FAMILY MEDICINE

## 2023-02-16 PROCEDURE — 80048 BASIC METABOLIC PNL TOTAL CA: CPT | Performed by: FAMILY MEDICINE

## 2023-02-16 PROCEDURE — 80061 LIPID PANEL: CPT | Performed by: FAMILY MEDICINE

## 2023-02-17 ENCOUNTER — OFFICE VISIT (OUTPATIENT)
Dept: FAMILY MEDICINE CLINIC | Facility: CLINIC | Age: 68
End: 2023-02-17
Payer: MEDICARE

## 2023-02-17 VITALS
HEART RATE: 68 BPM | BODY MASS INDEX: 35.76 KG/M2 | DIASTOLIC BLOOD PRESSURE: 72 MMHG | HEIGHT: 73 IN | TEMPERATURE: 97.5 F | OXYGEN SATURATION: 98 % | SYSTOLIC BLOOD PRESSURE: 140 MMHG | WEIGHT: 269.8 LBS

## 2023-02-17 DIAGNOSIS — R74.8 ELEVATED CK: ICD-10-CM

## 2023-02-17 DIAGNOSIS — Z78.9 STATIN INTOLERANCE: ICD-10-CM

## 2023-02-17 DIAGNOSIS — Z82.49 FAMILY HISTORY OF ISCHEMIC HEART DISEASE: ICD-10-CM

## 2023-02-17 DIAGNOSIS — E78.2 MIXED HYPERLIPIDEMIA: Primary | ICD-10-CM

## 2023-02-17 DIAGNOSIS — E11.9 TYPE 2 DIABETES MELLITUS WITHOUT COMPLICATION, WITHOUT LONG-TERM CURRENT USE OF INSULIN: ICD-10-CM

## 2023-02-17 DIAGNOSIS — M54.17 LUMBOSACRAL RADICULOPATHY: ICD-10-CM

## 2023-02-17 DIAGNOSIS — I25.10 CORONARY ARTERY CALCIFICATION: ICD-10-CM

## 2023-02-17 DIAGNOSIS — I10 ESSENTIAL HYPERTENSION: ICD-10-CM

## 2023-02-17 DIAGNOSIS — I25.84 CORONARY ARTERY CALCIFICATION: ICD-10-CM

## 2023-02-17 PROBLEM — R76.8 POSITIVE ANA (ANTINUCLEAR ANTIBODY): Status: ACTIVE | Noted: 2023-02-17

## 2023-02-17 PROCEDURE — 99214 OFFICE O/P EST MOD 30 MIN: CPT | Performed by: FAMILY MEDICINE

## 2023-02-17 RX ORDER — GEMFIBROZIL 600 MG/1
600 TABLET, FILM COATED ORAL
Qty: 180 TABLET | Refills: 1 | Status: SHIPPED | OUTPATIENT
Start: 2023-02-17 | End: 2023-03-28

## 2023-02-17 NOTE — PROGRESS NOTES
"\"Chief Complaint  Hypertension (3 months check up follow up from Dr Hale had labs and follow up CT calcium score) and Hyperlipidemia     3-month follow-up for hyperlipidemia, elevated CK, coronary calcium score/testing results, and neurologist visit    LOV with me in November for persistent elevation of CK, uncontrolled hyperlipidemia, and wellness exam  --  routine labs all WNL except for lipids and CK  --Ordered CT cardiac calcium score given risk factors and hyperlipidemia, here for review.  -- Referred back to Dr. Hael for evaluation of abnormal EMG and assistance with elevated CK levels  -- Started on Lopid, due to intolerance to statins and Zetia  -- Strongly encouraged to improve upon a low-cholesterol diet/exercise/needed weight loss    Other interval history since last visit with me --he did follow back up with his neurologist/Dr. Hale to review labs and EMG findings.  Records from visit 12/21/2022 with Dr. Hale reviewed, unclear significance for mild elevation of CK level, no myelopathic or neuropathic disease, suspect lumbosacral radiculopathy.  Follow-up as needed.    Overall, continues to do well.  Admits his diet and exercise has not been so good over the holiday months and winter months.  Also his compliance with Lopid is only been about 50 to 75%.  Weight remains stable.  No increased myalgias, only with joint arthralgias in knees and hands.    No new complaints or concerns today.  He did have his CT cardiac calcium score/testing done recently and is here for review.  Also had fasting lab work done last week, here for review.  Compliant with and tolerates all medications without side effects except statins, Zetia.  Only taking Lopid about 50% of the time.  He does take Mobic on a daily basis for joint arthralgias.    Review of Systems   Constitutional: Negative for fever and unexpected weight change.   Respiratory: Negative for cough and shortness of breath.    Cardiovascular: Negative for chest " "pain.        Subjective          Simone Kyle presents to [facility]    Objective   Vital Signs:   Vitals:    02/17/23 0907   BP: 140/72   BP Location: Left arm   Patient Position: Sitting   Cuff Size: Adult   Pulse: 68   Temp: 97.5 °F (36.4 °C)   SpO2: 98%   Weight: 122 kg (269 lb 12.8 oz)   Height: 185.4 cm (73\")      Body mass index is 35.6 kg/m².   Physical Exam  Vitals and nursing note reviewed.   Constitutional:       Appearance: Normal appearance. He is well-developed.   HENT:      Head: Normocephalic and atraumatic.      Nose: Nose normal.   Eyes:      Conjunctiva/sclera: Conjunctivae normal.      Pupils: Pupils are equal, round, and reactive to light.   Neck:      Thyroid: No thyromegaly.   Cardiovascular:      Rate and Rhythm: Normal rate and regular rhythm.      Heart sounds: Normal heart sounds. No murmur heard.  Pulmonary:      Effort: Pulmonary effort is normal.      Breath sounds: Normal breath sounds.   Abdominal:      General: Abdomen is flat. Bowel sounds are normal. There is no distension.      Palpations: Abdomen is soft. There is no hepatomegaly, splenomegaly or mass.      Tenderness: There is no abdominal tenderness. There is no guarding or rebound.      Hernia: No hernia is present.   Musculoskeletal:         General: Normal range of motion.      Cervical back: Normal range of motion and neck supple.      Right lower leg: No edema.      Left lower leg: No edema.   Lymphadenopathy:      Cervical: No cervical adenopathy.   Skin:     General: Skin is warm.   Neurological:      General: No focal deficit present.      Mental Status: He is alert.   Psychiatric:         Mood and Affect: Mood normal.         Behavior: Behavior normal.         Thought Content: Thought content normal.         Judgment: Judgment normal.        Result Review :     Common labs    Common Labs 11/10/22 11/10/22 11/11/22 11/11/22 11/11/22 2/16/23 2/16/23    0708 0708 1412 1412 1412 0706 0706 "   Glucose  112 (A)    108 (A)    BUN  15    13    Creatinine  0.98    0.84    Sodium  140    140    Potassium  4.9    4.4    Chloride  104    102    Calcium  9.3    9.4    Albumin  4.30        Total Bilirubin  0.4        Alkaline Phosphatase  60        AST (SGOT)  27        ALT (SGPT)  31        WBC    5.71      Hemoglobin    15.1      Hematocrit    45.2      Platelets    166      Total Cholesterol 258 (A)      263 (A)   Triglycerides 103      174 (A)   HDL Cholesterol 57      52   LDL Cholesterol  183 (A)      179 (A)   Hemoglobin A1C   6.00 (A)       PSA     1.510     (A) Abnormal value       Comments are available for some flowsheets but are not being displayed.           TSH    TSH 7/29/22 11/11/22   TSH 1.190 1.450           A1C Last 3 Results    HGBA1C Last 3 Results 7/26/22 11/11/22   Hemoglobin A1C 6.10 (A) 6.00 (A)   (A) Abnormal value       Comments are available for some flowsheets but are not being displayed.               Lab Results   Component Value Date    ANADIRECT Positive (A) 08/28/2020      CT [id number] Calcium Score Without Dye (12/29/2022 06:14)    Most recent CK level from 2/16/2023 --- 371 (previous levels 332, 338, over 400.)           Assessment and Plan    Diagnoses and all orders for this visit:    1. Mixed hyperlipidemia (Primary)  -remains uncontrolled  Given family history of CAD, persistently elevated LDL greater than 160, intolerant to statins, and new finding of coronary calcification with a calcium score 48/mild---will refer to cardiologist for further evaluation, stress testing consideration?  Ideally would like to try Repatha, Praluent, or Nexletol however doubt insurance coverage with only coronary calcification diagnosis?  He would like to continue with Lopid 600 mg twice daily at this time, for compliance with Lopid during the last few months.  Plan to recheck lipids, LFTs, CK in 3 to 4 months  Needs low-carb/low calorie/low cholesterol diet and increase cardio exercise to  greater than 150 minutes weekly  -     Ambulatory Referral to Cardiology  -     Comprehensive Metabolic Panel; Future  -     Lipid Panel With LDL / HDL Ratio; Future    2. Coronary artery calcification  -new diagnosis, mild, score 48  See above treatment plan  Needs aggressive risk factor control  , Start ASA 81 mg daily  -     Ambulatory Referral to Cardiology    3. Family history of ischemic heart disease  -see above plan  -     Ambulatory Referral to Cardiology    4. Statin intolerance  -elevated CK level, myalgias  He has been off statins now x2 years    5. Elevated CK  -remains stable, mild elevation.  Currently relatively asymptomatic.  Prior history of myalgias with statin use.  Needs to remain off statins, Zetia.  S/P work-up by neurologist/Dr. Hale, unclear significance for mild elevation.  Related to lumbosacral radiculopathy?  No further follow-up needed at this time by neurology.  -     CK; Future    6. Lumbosacral radiculopathy  -controlled  May continue with Mobic as needed, normal CBC and BMP    7. Essential hypertension  -fair control  No change with Norvasc or Benicar  -     Comprehensive Metabolic Panel; Future    8. Type 2 diabetes mellitus without complication, without long-term current use of insulin (HCC)  -diet controlled  Most recent A1c at 6, will plan to recheck in 3 months  Needs low-carb/low calorie/low cholesterol diet and increase cardio exercise to greater than 150 minutes weekly    Other orders  -     gemfibrozil (LOPID) 600 MG tablet; Take 1 tablet by mouth 2 (Two) Times a Day Before Meals.  Dispense: 180 tablet; Refill: 1        Follow Up   Return in about 3 months (around 5/17/2023) for Recheck.  Patient was given instructions and counseling regarding his condition or for health maintenance advice. Please see specific information pulled into the AVS if appropriate.

## 2023-02-17 NOTE — PATIENT INSTRUCTIONS
Recommend low fat/low calorie diet and exercise greater than 150 minutes of cardio per week.      Referral to cardiologist placed    Get lab work done prior to next visit in 3 months

## 2023-02-18 PROBLEM — G12.21: Status: ACTIVE | Noted: 2023-02-18

## 2023-03-28 ENCOUNTER — OFFICE VISIT (OUTPATIENT)
Dept: CARDIOLOGY | Facility: CLINIC | Age: 68
End: 2023-03-28
Payer: MEDICARE

## 2023-03-28 VITALS
SYSTOLIC BLOOD PRESSURE: 120 MMHG | BODY MASS INDEX: 35.25 KG/M2 | HEART RATE: 67 BPM | OXYGEN SATURATION: 98 % | DIASTOLIC BLOOD PRESSURE: 86 MMHG | WEIGHT: 266 LBS | HEIGHT: 73 IN

## 2023-03-28 DIAGNOSIS — E78.2 MIXED HYPERLIPIDEMIA: Primary | ICD-10-CM

## 2023-03-28 PROCEDURE — 3079F DIAST BP 80-89 MM HG: CPT | Performed by: STUDENT IN AN ORGANIZED HEALTH CARE EDUCATION/TRAINING PROGRAM

## 2023-03-28 PROCEDURE — 1160F RVW MEDS BY RX/DR IN RCRD: CPT | Performed by: STUDENT IN AN ORGANIZED HEALTH CARE EDUCATION/TRAINING PROGRAM

## 2023-03-28 PROCEDURE — 93000 ELECTROCARDIOGRAM COMPLETE: CPT | Performed by: STUDENT IN AN ORGANIZED HEALTH CARE EDUCATION/TRAINING PROGRAM

## 2023-03-28 PROCEDURE — 3074F SYST BP LT 130 MM HG: CPT | Performed by: STUDENT IN AN ORGANIZED HEALTH CARE EDUCATION/TRAINING PROGRAM

## 2023-03-28 PROCEDURE — 1159F MED LIST DOCD IN RCRD: CPT | Performed by: STUDENT IN AN ORGANIZED HEALTH CARE EDUCATION/TRAINING PROGRAM

## 2023-03-28 PROCEDURE — 99204 OFFICE O/P NEW MOD 45 MIN: CPT | Performed by: STUDENT IN AN ORGANIZED HEALTH CARE EDUCATION/TRAINING PROGRAM

## 2023-03-28 RX ORDER — GEMFIBROZIL 600 MG/1
600 TABLET, FILM COATED ORAL
Qty: 180 TABLET | Refills: 1 | Status: SHIPPED | OUTPATIENT
Start: 2023-03-28

## 2023-03-28 RX ORDER — PRAVASTATIN SODIUM 10 MG
10 TABLET ORAL DAILY
Qty: 30 TABLET | Refills: 11 | Status: SHIPPED | OUTPATIENT
Start: 2023-03-28 | End: 2023-03-28

## 2023-03-28 NOTE — PROGRESS NOTES
Subjective:     Encounter Date:03/28/2023      Patient ID: Simone Kyle is a 67 y.o. male.    Chief Complaint:  hyperlipidemia    HPI:   67 y.o. male with hypertension, hyperlipidemia, and prediabetes who presents for initial evaluation of hyperlipidemia.  Patient is followed by Dr. Bennett.  He was initially on Crestor and subsequently on atorvastatin for his hyperlipidemia however he had significant myalgias.  His CK has been mildly elevated in the 2-300s for the last couple of years.  He has been intermittently on gemfibrozil.  He notes chronic knee pain with intermittent myalgias.  He has also been seen by neurology due to his elevated CK and he had an EMG done that showed left leg peripheral neuropathy with no other findings.  With respect to any cardiac symptoms, he denies any shortness of breath, dyspnea exertion, chest pressure, palpitations, lower extremity edema    His most recent lipid panel on 2/16/2023 with a total cholesterol 263, triglycerides 124, HDL 52, .  His 10-year ASCVD risk is about 18%.    The following portions of the patient's history were reviewed and updated as appropriate: allergies, current medications, past family history, past medical history, past social history, past surgical history and problem list.     REVIEW OF SYSTEMS:   All systems reviewed.  Pertinent positives identified in HPI.  All other systems are negative.    Past Medical History:   Diagnosis Date   • DDD (degenerative disc disease), lumbar     W/ STENOSIS   • Diabetes mellitus (HCC)     CONTROLLED   • Hyperlipidemia    • Hypertension     STABLE   • Knee pain, bilateral    • Myalgia        Family History   Problem Relation Age of Onset   • ALS Mother    • Hypertension Father    • Stroke Father    • Diabetes Maternal Grandmother        Social History     Socioeconomic History   • Marital status:    Tobacco Use   • Smoking status: Never   • Smokeless tobacco: Never   Vaping Use   • Vaping Use: Never used    Substance and Sexual Activity   • Alcohol use: Yes     Alcohol/week: 6.0 - 7.0 standard drinks     Types: 6 - 7 Cans of beer per week     Comment: OCCASIONAL   • Drug use: Yes     Types: Marijuana     Comment: quit 1980   • Sexual activity: Defer       No Known Allergies    Past Surgical History:   Procedure Laterality Date   • ABDOMINAL HERNIA REPAIR     • MEDIAL COLLATERAL LIGAMENT REPAIR, KNEE Left          ECG 12 Lead    Date/Time: 3/28/2023 4:09 PM  Performed by: Kvng Melton MD  Authorized by: Kvng Melton MD   Comparison: not compared with previous ECG   Previous ECG: no previous ECG available  Rhythm: sinus rhythm  Rate: normal  Conduction: conduction normal  ST Segments: ST segments normal  T Waves: T waves normal  QRS axis: normal    Clinical impression: normal ECG               Objective:         Vitals:    03/28/23 1402   BP: 120/86   Pulse: 67   SpO2: 98%       PHYSICAL EXAM:  GEN: well appearing, in NAD   HEENT: NCAT, EOMI, moist mucus membranes   Respiratory: CTAB, no wheezes, rales or rhonchi  CV: normal rate, regular rhythm, normal S1, S2, no murmurs, rubs, gallops, +2 radial pulses b/l  GI: soft, nontender, nondistended  MSK: no edema  Skin: no rash, warm, dry  Heme/Lymph: no bruising or bleeding  Psych: organized thought, normal behavior and affect  Neuro: Alert and Oriented x 3, grossly normal motor function        Assessment:         (E78.2) Mixed hyperlipidemia    67 y.o. male with hypertension, hyperlipidemia, and prediabetes who presents for initial evaluation of hyperlipidemia.          Plan:       #Hyperlipidemia  Lipid panel on 2/16/2023 with a total cholesterol 263, triglycerides 124, HDL 52, .  - Had lengthy discussion with patient regarding his lipid management.  Given his elevated CKs, would avoid trial of statin or Zetia.  He is currently tolerating gemfibrozil which we will continue.  Discussed possible initiation of PCSK9 inhibitor given his ASCVD risk and high LDL.  Patient  would prefer to try diet and exercise and gemfibrozil for another couple months.  - Repeat lipid panel in 3 months, if still significantly elevated, will rediscuss initiation of PCSK9 inhibitor    Dr Bennett, thank you very much for referring this kind patient to me. Please call me with any questions or concerns. I will see the patient again in the office in 3 months.       Kvng Melton MD  03/28/23  [facility]    Outpatient Encounter Medications as of 3/28/2023   Medication Sig Dispense Refill   • amLODIPine (NORVASC) 10 MG tablet Take 1 tablet by mouth Daily. 90 tablet 1   • gemfibrozil (LOPID) 600 MG tablet Take 1 tablet by mouth 2 (Two) Times a Day Before Meals. 180 tablet 1   • meloxicam (MOBIC) 15 MG tablet Take 1 tablet by mouth Daily. 30 tablet 2   • olmesartan-hydrochlorothiazide (BENICAR HCT) 40-12.5 MG per tablet Take 1 tablet by mouth Daily. 90 tablet 1   • [DISCONTINUED] gemfibrozil (LOPID) 600 MG tablet Take 1 tablet by mouth 2 (Two) Times a Day Before Meals. 180 tablet 1   • [DISCONTINUED] pravastatin (PRAVACHOL) 10 MG tablet Take 1 tablet by mouth Daily. 30 tablet 11     No facility-administered encounter medications on file as of 3/28/2023.

## 2023-04-10 RX ORDER — AMLODIPINE BESYLATE 10 MG/1
10 TABLET ORAL DAILY
Qty: 90 TABLET | Refills: 1 | Status: SHIPPED | OUTPATIENT
Start: 2023-04-10

## 2023-05-18 ENCOUNTER — LAB (OUTPATIENT)
Dept: LAB | Facility: HOSPITAL | Age: 68
End: 2023-05-18
Payer: MEDICARE

## 2023-05-18 PROCEDURE — 82550 ASSAY OF CK (CPK): CPT | Performed by: FAMILY MEDICINE

## 2023-05-18 PROCEDURE — 80053 COMPREHEN METABOLIC PANEL: CPT | Performed by: FAMILY MEDICINE

## 2023-05-18 PROCEDURE — 80061 LIPID PANEL: CPT | Performed by: FAMILY MEDICINE

## 2023-05-19 ENCOUNTER — OFFICE VISIT (OUTPATIENT)
Dept: FAMILY MEDICINE CLINIC | Facility: CLINIC | Age: 68
End: 2023-05-19
Payer: MEDICARE

## 2023-05-19 VITALS
HEIGHT: 73 IN | TEMPERATURE: 97.8 F | WEIGHT: 267 LBS | DIASTOLIC BLOOD PRESSURE: 78 MMHG | SYSTOLIC BLOOD PRESSURE: 134 MMHG | HEART RATE: 74 BPM | OXYGEN SATURATION: 99 % | BODY MASS INDEX: 35.39 KG/M2

## 2023-05-19 DIAGNOSIS — Q82.8 KERATOSIS OF PLANTAR ASPECT OF FOOT: ICD-10-CM

## 2023-05-19 DIAGNOSIS — L30.9 DERMATITIS: ICD-10-CM

## 2023-05-19 DIAGNOSIS — E11.9 TYPE 2 DIABETES MELLITUS WITHOUT COMPLICATION, WITHOUT LONG-TERM CURRENT USE OF INSULIN: ICD-10-CM

## 2023-05-19 DIAGNOSIS — R74.8 ELEVATED CK: ICD-10-CM

## 2023-05-19 DIAGNOSIS — E78.2 MIXED HYPERLIPIDEMIA: Primary | ICD-10-CM

## 2023-05-19 DIAGNOSIS — I10 ESSENTIAL HYPERTENSION: ICD-10-CM

## 2023-05-19 DIAGNOSIS — M25.552 PAIN OF LEFT HIP: ICD-10-CM

## 2023-05-19 DIAGNOSIS — L82.1 SEBORRHEIC KERATOSIS: ICD-10-CM

## 2023-05-19 RX ORDER — COLESEVELAM 180 1/1
1875 TABLET ORAL 2 TIMES DAILY WITH MEALS
Qty: 180 TABLET | Refills: 5 | Status: SHIPPED | OUTPATIENT
Start: 2023-05-19

## 2023-05-19 RX ORDER — MOMETASONE FUROATE 1 MG/G
1 CREAM TOPICAL DAILY
Qty: 60 G | Refills: 1 | Status: SHIPPED | OUTPATIENT
Start: 2023-05-19

## 2023-05-19 NOTE — PROGRESS NOTES
Chief Complaint  Hypertension (3 months check up had labs yesterday), Hyperlipidemia, Hip Pain (Right hip), Foot Pain (Left foot pain), and Skin Lesion (Itchy spot on right leg wants cream)     3-month follow-up on hyperlipidemia, elevated CK levels, and new complaints of right hip pain, left foot pain, itchy skin on legs, and a concerning skin lesion on right leg    LOV with me in February for uncontrolled hyperlipidemia, elevated persistent CK levels, and chronic med refills with labs  -- Given persistent uncontrolled LDL, family history of premature CAD only abnormal cardiac calcium score he was referred to cardiology for further recommendations regarding possible stress testing and consideration of initiating Repatha or Praluent due to intolerance to statins and Zetia (persistently elevated CK levels and myalgias.)  --Started on ASA 81 mg daily  -- Continue Lopid, was only taking once a day vs BID was going to try to improve upon diet and exercise    Overall, doing fine.  He did see cardiologist/Dr. Hernandez on 3/28/2023, records reviewed.  He agreed with needing aggressive risk factor control and much improved LDL, preferably under 70.  Also suggested trying a PCSK9 inhibitor given intolerance to statins and Zetia.  Yet, did not initiate medication as patient wanted to continue with Lopid and try to improve his lifestyle.  He has been trying to improve upon a healthier diet but admits his diet still is poor at times.  He feels he is having a hard time losing weight due to various aches and pains.  Weight remains stable.  No chest pain, SOA.    Also, has a few other complaints.  --Complains of left hip pain x2 to 3 weeks.  No known injury.  Denies back pain or radicular pain.  Has been taking Mobic on an as-needed basis.  --Complains of chronic left foot pain.  Previously treated by podiatrist and debrided calluses/IPK's?  But he feels as if they are coming back again and this is inhibiting him from exercising and  "losing weight.  -- Requesting a refill on the \"itching medication\" that was given to him in the past??  -- Concerned about an area on his right lower extremity, would like this checked out.    Otherwise, just had repeat fasting lab work done last week.  Here for review.  Compliant with and tolerates all medications without side effects, except for statins and Zetia.      Review of Systems   Constitutional: Negative for fever and unexpected weight change.   Respiratory: Negative for cough and shortness of breath.    Cardiovascular: Negative for chest pain.        Subjective          Simone Kyle presents to [facility]    Objective   Vital Signs:   Vitals:    05/19/23 0908   BP: 134/78   BP Location: Left arm   Patient Position: Sitting   Cuff Size: Adult   Pulse: 74   Temp: 97.8 °F (36.6 °C)   SpO2: 99%   Weight: 121 kg (267 lb)   Height: 185.4 cm (73\")      Body mass index is 35.23 kg/m².   Physical Exam  Vitals and nursing note reviewed.   Constitutional:       Appearance: Normal appearance. He is well-developed.   HENT:      Head: Normocephalic and atraumatic.      Nose: Nose normal.   Eyes:      Conjunctiva/sclera: Conjunctivae normal.      Pupils: Pupils are equal, round, and reactive to light.   Neck:      Thyroid: No thyromegaly.   Cardiovascular:      Rate and Rhythm: Normal rate and regular rhythm.      Heart sounds: Normal heart sounds. No murmur heard.  Pulmonary:      Effort: Pulmonary effort is normal.      Breath sounds: Normal breath sounds.   Abdominal:      General: Abdomen is flat. Bowel sounds are normal. There is no distension.      Palpations: Abdomen is soft. There is no hepatomegaly, splenomegaly or mass.      Tenderness: There is no abdominal tenderness. There is no guarding or rebound.      Hernia: No hernia is present.   Musculoskeletal:         General: Normal range of motion.      Cervical back: Normal range of motion and neck supple.      Right lower leg: No " edema.      Left lower leg: No edema.      Comments: Left hip --mild tenderness, normal range of motion, normal strength    Back -nontender SI joint, nontender paraspinal muscles, negative straight leg raise   Lymphadenopathy:      Cervical: No cervical adenopathy.   Skin:     General: Skin is warm.      Comments: Right lower extremity --1 cm raised light brown stuck on like plaque, scattered mild eczematous lesions   Neurological:      General: No focal deficit present.      Mental Status: He is alert.   Psychiatric:         Mood and Affect: Mood normal.         Behavior: Behavior normal.         Thought Content: Thought content normal.         Judgment: Judgment normal.        Result Review :     Common labs        2/16/2023    07:06 5/18/2023    07:00 5/19/2023    10:01   Common Labs   Glucose 108   112      BUN 13   16      Creatinine 0.84   0.92      Sodium 140   145      Potassium 4.4   4.3      Chloride 102   108      Calcium 9.4   9.3      Albumin  4.4      Total Bilirubin  0.3      Alkaline Phosphatase  62      AST (SGOT)  20      ALT (SGPT)  30      Total Cholesterol 263   256      Triglycerides 174   144      HDL Cholesterol 52   55      LDL Cholesterol  179   175      Microalbumin, Urine   <3.0       TSH        7/29/2022    15:00 11/11/2022    14:12   TSH   TSH 1.190   1.450           Lab Results   Component Value Date    ANADIRECT Positive (A) 08/28/2020      Office Visit with Kvng Melton MD (03/28/2023)    May 2023 ----CK level 344 (previously 371 in March, 471 one year ago)         Assessment and Plan    Diagnoses and all orders for this visit:    1. Mixed hyperlipidemia (Primary)  -remains uncontrolled  Referral to nutritionist  Stop Lopid, start WelChol as directed below  Needs low-carb/low calorie/low cholesterol diet and increase cardio exercise to greater than 150 minutes weekly  -     Lipid Panel With LDL / HDL Ratio; Future  -     Ambulatory Referral to Nutrition Services    2. Elevated CK   -remains elevated but stable  Worked up and seen neurologist in past.  Improving since discontinuation of statins 1 year ago.  Continue to follow.  -     CK; Future    3. Essential hypertension  -controlled  Continue both Benicar and Norvasc as prescribed  -     Basic Metabolic Panel; Future    4. Type 2 diabetes mellitus without complication, without long-term current use of insulin  -diet controlled  Due to update urine microalbumin  Referral to nutritionist.  -     Microalbumin / Creatinine Urine Ratio - Urine, Clean Catch  -     Hemoglobin A1c; Future  -     Ambulatory Referral to Nutrition Services    5. Pain of left hip  -new Dx, mild.  Suspect OA vs hip strain  Recommend taking Mobic x7 to 10 days then as needed.  If not better, follow-up for further imaging and evaluation    6. Keratosis of plantar aspect of foot  -needs to follow back up with his podiatrist, left foot    7. Seborrheic keratosis  -right lower extremity, new finding  Benign.  Reassurance given.    8. Dermatitis  -mild exacerbation  Refill Elocon as directed below as needed    Other orders  -     colesevelam (Welchol) 625 MG tablet; Take 3 tablets by mouth 2 (Two) Times a Day With Meals.  Dispense: 180 tablet; Refill: 5  -     mometasone (Elocon) 0.1 % cream; Apply 1 application topically to the appropriate area as directed Daily.  Dispense: 60 g; Refill: 1        Follow Up   Return in about 3 months (around 8/19/2023) for Recheck, labs first then appointment.  Patient was given instructions and counseling regarding his condition or for health maintenance advice. Please see specific information pulled into the AVS if appropriate.

## 2023-05-19 NOTE — PATIENT INSTRUCTIONS
Stop Lopid    Start WelChol 3 tablets by mouth 2 times a day with meals    Recommend low fat/low calorie diet and exercise greater than 150 minutes of cardio per week.      Follow-up with me and cardiologist as planned

## 2023-05-20 LAB
ALBUMIN/CREAT UR: <4 MG/G CREAT (ref 0–29)
CREAT UR-MCNC: 74.3 MG/DL
MICROALBUMIN UR-MCNC: <3 UG/ML

## 2023-06-14 RX ORDER — MELOXICAM 15 MG/1
15 TABLET ORAL DAILY
Qty: 30 TABLET | Refills: 2 | Status: SHIPPED | OUTPATIENT
Start: 2023-06-14

## 2023-07-31 RX ORDER — OLMESARTAN MEDOXOMIL AND HYDROCHLOROTHIAZIDE 40/12.5 40; 12.5 MG/1; MG/1
1 TABLET ORAL DAILY
Qty: 90 TABLET | Refills: 1 | Status: SHIPPED | OUTPATIENT
Start: 2023-07-31

## 2023-08-22 ENCOUNTER — LAB (OUTPATIENT)
Dept: LAB | Facility: HOSPITAL | Age: 68
End: 2023-08-22
Payer: MEDICARE

## 2023-08-22 PROCEDURE — 80061 LIPID PANEL: CPT | Performed by: FAMILY MEDICINE

## 2023-08-22 PROCEDURE — 82550 ASSAY OF CK (CPK): CPT | Performed by: FAMILY MEDICINE

## 2023-08-22 PROCEDURE — 83036 HEMOGLOBIN GLYCOSYLATED A1C: CPT | Performed by: FAMILY MEDICINE

## 2023-08-22 PROCEDURE — 80048 BASIC METABOLIC PNL TOTAL CA: CPT | Performed by: FAMILY MEDICINE

## 2023-08-23 ENCOUNTER — OFFICE VISIT (OUTPATIENT)
Dept: FAMILY MEDICINE CLINIC | Facility: CLINIC | Age: 68
End: 2023-08-23
Payer: MEDICARE

## 2023-08-23 VITALS
OXYGEN SATURATION: 99 % | WEIGHT: 270 LBS | DIASTOLIC BLOOD PRESSURE: 92 MMHG | HEIGHT: 73 IN | SYSTOLIC BLOOD PRESSURE: 160 MMHG | BODY MASS INDEX: 35.78 KG/M2 | TEMPERATURE: 97.7 F | HEART RATE: 60 BPM

## 2023-08-23 DIAGNOSIS — I25.10 CORONARY ARTERY CALCIFICATION: ICD-10-CM

## 2023-08-23 DIAGNOSIS — R74.8 ELEVATED CK: ICD-10-CM

## 2023-08-23 DIAGNOSIS — I25.84 CORONARY ARTERY CALCIFICATION: ICD-10-CM

## 2023-08-23 DIAGNOSIS — I10 ESSENTIAL HYPERTENSION: Primary | ICD-10-CM

## 2023-08-23 DIAGNOSIS — E11.9 TYPE 2 DIABETES MELLITUS WITHOUT COMPLICATION, WITHOUT LONG-TERM CURRENT USE OF INSULIN: ICD-10-CM

## 2023-08-23 DIAGNOSIS — E78.2 MIXED HYPERLIPIDEMIA: ICD-10-CM

## 2023-08-23 RX ORDER — OLMESARTAN MEDOXOMIL AND HYDROCHLOROTHIAZIDE 40/25 40; 25 MG/1; MG/1
1 TABLET ORAL DAILY
Qty: 90 TABLET | Refills: 1 | Status: SHIPPED | OUTPATIENT
Start: 2023-08-23

## 2023-08-23 NOTE — PROGRESS NOTES
Chief Complaint  Hyperlipidemia (3 months check ip follow up labs) and Hypertension    3-month follow-up for hyperlipidemia, elevated CK, statin intolerance, coronary artery calcification, and HTN    LOV with me in May for uncontrolled, new diagnosis of patient, and miscellaneous complaints of left hip strain and foot keratosis  -- Started WelChol due to persistently uncontrolled LDL, given prediabetes and new diagnosis of coronary artery calcification.  -- Continue to follow/monitor CK levels due to elevation and statin intolerance in past  -- Reviewed cardiology records, no further stress testing indicated but agreed with aggressive lipid control and possible initiation of PCSK9 inhibitor.  -- Treated conservatively for his left hip strain, suggested follow-up with his podiatrist for his plantar keratoses    Overall, still continues to do well.  Still working part-time at [employer].  Unfortunately, he never went to the nutritionist due to scheduling difficulties (numerous phone calls back-and-forth.)  He did start the WelChol, tolerating, but only taking 2 pills twice a day instead of 3 pills twice a day.  Still tries to maintain a low-cholesterol diet and regular cardio exercise.  Weight remains stable overall.  Mood is good.  Sleeping fine.  No chest pain, SOA, or edema.  Myalgias have essentially resolved since discontinuation of the statins.    Does not check home blood pressures.  Blood pressure trend is always up and down at his visits.  No adjustments with medications have been made in a while, currently on Benicar HCT 40/12.5 mg daily and Norvasc 10 mg daily    No new complaints or concerns today.  Just had repeat fasting lab work done last week, here for review.  Compliant with and tolerates current medications without side effects.    Review of Systems   Constitutional:  Negative for fever and unexpected weight change.   Respiratory:  Negative for cough and shortness of breath.    Cardiovascular:   "Negative for chest pain.      Subjective          Simone Kyle presents to [facility]    Objective   Vital Signs:   Vitals:    08/23/23 0927 08/23/23 0933   BP: 154/90 160/92   BP Location: Right arm    Patient Position: Sitting    Cuff Size: Adult    Pulse: 60    Temp: 97.7 øF (36.5 øC)    SpO2: 99%    Weight: 122 kg (270 lb)    Height: 185.4 cm (73\")       Body mass index is 35.62 kg/mý.   Physical Exam  Vitals and nursing note reviewed.   Constitutional:       Appearance: Normal appearance. He is well-developed.   HENT:      Head: Normocephalic and atraumatic.      Nose: Nose normal.   Eyes:      Conjunctiva/sclera: Conjunctivae normal.      Pupils: Pupils are equal, round, and reactive to light.   Neck:      Thyroid: No thyromegaly.   Cardiovascular:      Rate and Rhythm: Normal rate and regular rhythm.      Heart sounds: Normal heart sounds. No murmur heard.  Pulmonary:      Effort: Pulmonary effort is normal.      Breath sounds: Normal breath sounds.   Abdominal:      General: Abdomen is flat. Bowel sounds are normal. There is no distension.      Palpations: Abdomen is soft. There is no hepatomegaly, splenomegaly or mass.      Tenderness: There is no abdominal tenderness. There is no guarding or rebound.      Hernia: No hernia is present.   Musculoskeletal:         General: Normal range of motion.      Cervical back: Normal range of motion and neck supple.      Right lower leg: No edema.      Left lower leg: No edema.   Lymphadenopathy:      Cervical: No cervical adenopathy.   Skin:     General: Skin is warm.   Neurological:      General: No focal deficit present.      Mental Status: He is alert.   Psychiatric:         Mood and Affect: Mood normal.         Behavior: Behavior normal.         Thought Content: Thought content normal.         Judgment: Judgment normal.      Result Review :     Common labs          5/18/2023    07:00 5/19/2023    10:01 8/22/2023    06:29   Common Labs "   Glucose 112   117    BUN 16   17    Creatinine 0.92   0.95    Sodium 145   144    Potassium 4.3   4.6    Chloride 108   110    Calcium 9.3   9.1    Albumin 4.4      Total Bilirubin 0.3      Alkaline Phosphatase 62      AST (SGOT) 20      ALT (SGPT) 30      Total Cholesterol 256   228    Triglycerides 144   141    HDL Cholesterol 55   45    LDL Cholesterol  175   157    Hemoglobin A1C   5.70    Microalbumin, Urine  <3.0       TSH          11/11/2022    14:12   TSH   TSH 1.450      A1C Last 3 Results          11/11/2022    14:12 8/22/2023    06:29   HGBA1C Last 3 Results   Hemoglobin A1C 6.00  5.70          Lab Results   Component Value Date    ANADIRECT Positive (A) 08/28/2020 August 2023 ---  (previously 344, 371, over 400)           Assessment and Plan    Diagnoses and all orders for this visit:    1. Essential hypertension (Primary) --uncontrolled  Labile blood pressures over the last several visits, given risk factors and DM would like better blood pressure control.  Recommend increasing Benicar to the 40/25 mg dose 1 p.o. daily  Again, stressed the importance of needed weight loss to help with all risk factors ---Needs low-carb/low calorie/low cholesterol diet and increase cardio exercise to greater than 150 minutes weekly   -     Comprehensive Metabolic Panel; Future    2. Mixed hyperlipidemia --remains uncontrolled   Some improvement with WelChol, although still very uncontrolled.  Again, stressed the importance of a goal LDL at least less than 100 given prediabetes, family history of CAD, and mild coronary artery calcification.  S/P consultation with cardiologist earlier this year/Dr. Hernandez (no recommendation for cardiac stress testing however agrees with the need for aggressive lipid control and possibly starting a PCSK9 inhibitor given statin intolerance/CK elevation/myalgias.)  However, he is still strongly against the idea starting this med.  Would like the opportunity to improve diet,  increase the WelChol to maximum dose and recheck things.  Thus, plan to continue WelChol at 625 mg take 3 tablets BID  -     Comprehensive Metabolic Panel; Future  -     Lipid Panel With LDL / HDL Ratio; Future    3. Elevated CK --improving, back down to near normal  Thus hold on further checking CK levels.  Clinically much improved since discontinuing statins and Zetia    4. Coronary artery calcification --new Dx, calcium score 48, mild  S/P consultation with cardiologist/Dr. Hernandez, no stress testing needed.  Continue with aggressive risk factor control, see above plan    5. Type 2 diabetes mellitus without complication, without long-term current use of insulin --- stable, A1c 5.7.  Doing better.  Needs low-carb/low calorie/low cholesterol diet and increase cardio exercise to greater than 150 minutes weekly   -     Hemoglobin A1c; Future    Other orders  -     olmesartan-hydrochlorothiazide (Benicar HCT) 40-25 MG per tablet; Take 1 tablet by mouth Daily.  Dispense: 90 tablet; Refill: 1        Follow Up   Return in about 3 months (around 11/23/2023) for Medicare Wellness, may use a hospital spot.  Patient was given instructions and counseling regarding his condition or for health maintenance advice. Please see specific information pulled into the AVS if appropriate.

## 2023-10-04 RX ORDER — AMLODIPINE BESYLATE 10 MG/1
10 TABLET ORAL DAILY
Qty: 90 TABLET | Refills: 1 | Status: SHIPPED | OUTPATIENT
Start: 2023-10-04

## 2023-11-16 RX ORDER — MELOXICAM 15 MG/1
15 TABLET ORAL DAILY
Qty: 30 TABLET | Refills: 2 | Status: SHIPPED | OUTPATIENT
Start: 2023-11-16

## 2024-03-11 ENCOUNTER — LAB (OUTPATIENT)
Dept: LAB | Facility: HOSPITAL | Age: 69
End: 2024-03-11
Payer: MEDICARE

## 2024-03-11 PROCEDURE — 80053 COMPREHEN METABOLIC PANEL: CPT | Performed by: FAMILY MEDICINE

## 2024-03-11 PROCEDURE — 83036 HEMOGLOBIN GLYCOSYLATED A1C: CPT | Performed by: FAMILY MEDICINE

## 2024-03-11 PROCEDURE — 80061 LIPID PANEL: CPT | Performed by: FAMILY MEDICINE

## 2024-03-13 ENCOUNTER — OFFICE VISIT (OUTPATIENT)
Dept: FAMILY MEDICINE CLINIC | Facility: CLINIC | Age: 69
End: 2024-03-13
Payer: MEDICARE

## 2024-03-13 VITALS
DIASTOLIC BLOOD PRESSURE: 82 MMHG | HEIGHT: 73 IN | HEART RATE: 76 BPM | SYSTOLIC BLOOD PRESSURE: 126 MMHG | TEMPERATURE: 97.8 F | OXYGEN SATURATION: 99 % | WEIGHT: 273.6 LBS | BODY MASS INDEX: 36.26 KG/M2

## 2024-03-13 DIAGNOSIS — I10 ESSENTIAL HYPERTENSION: ICD-10-CM

## 2024-03-13 DIAGNOSIS — R63.5 WEIGHT GAIN: ICD-10-CM

## 2024-03-13 DIAGNOSIS — Z12.5 ENCOUNTER FOR PROSTATE CANCER SCREENING: ICD-10-CM

## 2024-03-13 DIAGNOSIS — M15.9 GENERALIZED OA: ICD-10-CM

## 2024-03-13 DIAGNOSIS — Z12.11 ENCOUNTER FOR SCREENING COLONOSCOPY: ICD-10-CM

## 2024-03-13 DIAGNOSIS — R74.8 ELEVATED CK: ICD-10-CM

## 2024-03-13 DIAGNOSIS — L82.1 SEBORRHEIC KERATOSIS: ICD-10-CM

## 2024-03-13 DIAGNOSIS — E78.2 MIXED HYPERLIPIDEMIA: ICD-10-CM

## 2024-03-13 DIAGNOSIS — Z00.00 ENCOUNTER FOR MEDICARE ANNUAL WELLNESS EXAM: Primary | ICD-10-CM

## 2024-03-13 DIAGNOSIS — Z78.9 STATIN INTOLERANCE: ICD-10-CM

## 2024-03-13 DIAGNOSIS — E11.9 TYPE 2 DIABETES MELLITUS WITHOUT COMPLICATION, WITHOUT LONG-TERM CURRENT USE OF INSULIN: ICD-10-CM

## 2024-03-13 NOTE — PATIENT INSTRUCTIONS
Needs tetanus shot, RSV vaccine, COVID-vaccine--may receive from pharmacy in near future    Stop WelChol  Start Nexletol 1 p.o. daily, likely prior authorization will be needed--- have pharmacy send needed paperwork for PA    Needs weight loss ---Recommend low fat/low calorie diet and exercise greater than 150 minutes of cardio per week.

## 2024-03-13 NOTE — PROGRESS NOTES
The ABCs of the Annual Wellness Visit  Subsequent Medicare Wellness Visit    Chief Complaint   Patient presents with    Medicare Wellness-subsequent     Yearly wellness follow up labs had cologuard 2020    Hyperlipidemia    Hypertension      Subjective    History of Present Illness:  Simone Kyle is a 68 y.o. male who presents for a Subsequent Medicare Wellness Visit.    NEEDS ANNUAL WELLNESS  And    6-month follow-up on HTN, hyperlipidemia, DM    Last visit with me in August 2023 for chronic med refills with labs, uncontrolled HTN, and uncontrolled lipids  --Benicar HCT increased to 40/12.5 mg daily  -- Discussed adding a PCSK9 inhibitor, but declined this option.  Preferred to do better with his WelChol compliance.    He was compliant with the above recommendations, increased dose of Benicar and is tolerating without side effects.  Doing better taking WelChol as directed.      Overall, still continues to do well.  Still working part-time at [employer].    Still tries to maintain a low-cholesterol diet and regular cardio exercise.  Weight gain of 3 pounds since last visit and about 7 pounds in the last year.    Mood is good.  Sleeping fine.  No chest pain, SOA, or edema.    Myalgias have essentially resolved since discontinuation of the statins.      No new complaints or concerns today.  Just had repeat fasting lab work done last week, here for review.  Compliant with and tolerates current medications without side effects.      Routine health maintenance/screening test:  Prostate Screening --- 2022, negative  Colorectal Screen --- Cologuard done in August 2020, negative  Vaccines --- due for several vaccinations  Smoking/ETOH Status --- non-smoker, no alcohol use  Dentist, Eye Exam, Derm --- maintains regular dental visits, eye exam, no dermatologist  Diet/Exercise --- tries to maintain a low-cholesterol diet, fair on diet.  Exercises sporadic  Pertinent FH --- significant for ALS/mother, CVA/father,  CAD/father, negative for colon cancer, prostate cancer           The following portions of the patient's history were reviewed and   updated as appropriate: allergies, current medications, past family history, past medical history, past social history, past surgical history, and problem list.    Compared to one year ago, the patient feels his physical   health is the same.    Compared to one year ago, the patient feels his mental   health is the same.    Recent Hospitalizations:  He was not admitted to the hospital during the last year.       Current Medical Providers:  Patient Care Team:  Diana Bennett MD as PCP - General (Family Medicine)    Outpatient Medications Prior to Visit   Medication Sig Dispense Refill    amLODIPine (NORVASC) 10 MG tablet Take 1 tablet by mouth Daily. 90 tablet 1    meloxicam (MOBIC) 15 MG tablet Take 1 tablet by mouth Daily. 30 tablet 2    mometasone (Elocon) 0.1 % cream Apply 1 application topically to the appropriate area as directed Daily. 60 g 1    olmesartan-hydrochlorothiazide (Benicar HCT) 40-25 MG per tablet Take 1 tablet by mouth Daily. 90 tablet 1    colesevelam (Welchol) 625 MG tablet Take 3 tablets by mouth 2 (Two) Times a Day With Meals. 180 tablet 5     No facility-administered medications prior to visit.       No opioid medication identified on active medication list. I have reviewed chart for other potential  high risk medication/s and harmful drug interactions in the elderly.        Aspirin is not on active medication list.  Aspirin use is not indicated based on review of current medical condition/s. Risk of harm outweighs potential benefits.  .    Patient Active Problem List   Diagnosis    Essential hypertension    Mixed hyperlipidemia    Generalized OA    Type 2 diabetes mellitus without complication, without long-term current use of insulin    Myalgia    Elevated CK    Statin intolerance    Abnormal EMG    Keratosis of plantar aspect of foot     "Hypertriglyceridemia    Family history of ischemic heart disease    Lumbosacral radiculopathy    Coronary artery calcification    Positive NASIM (antinuclear antibody)    Familial ALS (amyotrophic lateral sclerosis)    Pain of left hip    Seborrheic keratosis    Dermatitis     Advance Care Planning  Advance Directive is not on file.  ACP discussion was held with the patient during this visit. Patient does not have an advance directive, information provided.    Review of Systems   Constitutional:  Negative for fever.   Respiratory:  Negative for cough and shortness of breath.    Cardiovascular:  Negative for chest pain.        Objective    Vitals:    03/13/24 0835   BP: 126/82   BP Location: Right arm   Patient Position: Sitting   Cuff Size: Adult   Pulse: 76   Temp: 97.8 °F (36.6 °C)   SpO2: 99%   Weight: 124 kg (273 lb 9.6 oz)   Height: 185.4 cm (73\")   PainSc: 0-No pain     BMI Readings from Last 1 Encounters:   03/13/24 36.10 kg/m²   BMI is above normal parameters. Recommendations include: exercise counseling and nutrition counseling    Does the patient have evidence of cognitive impairment? No    Physical Exam  Vitals and nursing note reviewed.   Constitutional:       Appearance: Normal appearance. He is well-developed. He is obese.   HENT:      Head: Normocephalic and atraumatic.      Nose: Nose normal.   Eyes:      Conjunctiva/sclera: Conjunctivae normal.      Pupils: Pupils are equal, round, and reactive to light.   Neck:      Thyroid: No thyromegaly.   Cardiovascular:      Rate and Rhythm: Normal rate and regular rhythm.      Heart sounds: Normal heart sounds. No murmur heard.  Pulmonary:      Effort: Pulmonary effort is normal.      Breath sounds: Normal breath sounds.   Abdominal:      General: Abdomen is flat. Bowel sounds are normal. There is no distension.      Palpations: Abdomen is soft. There is no hepatomegaly, splenomegaly or mass.      Tenderness: There is no abdominal tenderness. There is no " guarding or rebound.      Hernia: No hernia is present.   Musculoskeletal:         General: Normal range of motion.      Cervical back: Normal range of motion and neck supple.      Right lower leg: No edema.      Left lower leg: No edema.   Feet:      Right foot:      Skin integrity: Skin integrity normal. No ulcer, blister or skin breakdown.      Left foot:      Skin integrity: Skin integrity normal. No ulcer, blister or skin breakdown.      Comments: S/P diabetic foot exam done, WNL  Lymphadenopathy:      Cervical: No cervical adenopathy.   Skin:     General: Skin is warm.      Comments: No dysplastic or abnormal lesions, except numerous light brown stuck on like plaques on extremities and trunk   Neurological:      General: No focal deficit present.      Mental Status: He is alert.   Psychiatric:         Mood and Affect: Mood normal.         Behavior: Behavior normal.         Thought Content: Thought content normal.         Judgment: Judgment normal.       Common labs          8/22/2023    06:29 3/11/2024    06:41 3/13/2024    09:21   Common Labs   Glucose 117  116     BUN 17  13     Creatinine 0.95  0.86     Sodium 144  140     Potassium 4.6  3.9     Chloride 110  104     Calcium 9.1  8.9     Albumin  4.2     Total Bilirubin  0.3     Alkaline Phosphatase  61     AST (SGOT)  22     ALT (SGPT)  27     WBC   4.53    Hemoglobin   15.4    Hematocrit   45.6    Platelets   152    Total Cholesterol 228  226     Triglycerides 141  177     HDL Cholesterol 45  51     LDL Cholesterol  157  143     Hemoglobin A1C 5.70  6.00     PSA   1.410      Lipid Panel          5/18/2023    07:00 8/22/2023    06:29 3/11/2024    06:41   Lipid Panel   Total Cholesterol 256  228  226    Triglycerides 144  141  177    HDL Cholesterol 55  45  51    VLDL Cholesterol 26  26  32    LDL Cholesterol  175  157  143    LDL/HDL Ratio 3.13  3.44  2.74      TSH          3/13/2024    09:21   TSH   TSH 1.550      A1C Last 3 Results          8/22/2023     06:29 3/11/2024    06:41   HGBA1C Last 3 Results   Hemoglobin A1C 5.70  6.00          Lab Results   Component Value Date    ANADIRECT Positive (A) 08/28/2020             HEALTH RISK ASSESSMENT    Smoking Status:  Social History     Tobacco Use   Smoking Status Never   Smokeless Tobacco Never     Alcohol Consumption:  Social History     Substance and Sexual Activity   Alcohol Use Yes    Alcohol/week: 6.0 - 7.0 standard drinks of alcohol    Types: 6 - 7 Cans of beer per week    Comment: OCCASIONAL     Fall Risk Screen:    KASANDRA Fall Risk Assessment was completed, and patient is at HIGH risk for falls. Assessment completed on:3/13/2024    Depression Screening:      3/13/2024     8:40 AM   PHQ-2/PHQ-9 Depression Screening   Little Interest or Pleasure in Doing Things 0-->not at all   Feeling Down, Depressed or Hopeless 0-->not at all   PHQ-9: Brief Depression Severity Measure Score 0       Health Habits and Functional and Cognitive Screening:      3/9/2024     8:59 AM   Functional & Cognitive Status   Do you have difficulty preparing food and eating? No   Do you have difficulty bathing yourself, getting dressed or grooming yourself? No   Do you have difficulty using the toilet? No   Do you have difficulty moving around from place to place? No   Do you have trouble with steps or getting out of a bed or a chair? No   Current Diet Well Balanced Diet   Dental Exam Up to date   Eye Exam Up to date   Exercise (times per week) 5 times per week   Do you need help using the phone?  No   Are you deaf or do you have serious difficulty hearing?  No   Do you need help to go to places out of walking distance? No   Do you need help shopping? No   Do you need help preparing meals?  No   Do you need help with housework?  No   Do you need help with laundry? No   Do you need help taking your medications? No   Do you need help managing money? No   Do you ever drive or ride in a car without wearing a seat belt? No   Have you felt unusual  stress, anger or loneliness in the last month? No   Who do you live with? Child   If you need help, do you have trouble finding someone available to you? No   Have you been bothered in the last four weeks by sexual problems? No   Do you have difficulty concentrating, remembering or making decisions? No       Age-appropriate Screening Schedule:  Refer to the list below for future screening recommendations based on patient's age, sex and/or medical conditions. Orders for these recommended tests are listed in the plan section. The patient has been provided with a written plan.    Health Maintenance   Topic Date Due    TDAP/TD VACCINES (1 - Tdap) Never done    ZOSTER VACCINE (1 of 2) Never done    RSV Vaccine - Adults (1 - 1-dose 60+ series) Never done    DIABETIC FOOT EXAM  Never done    DIABETIC EYE EXAM  Never done    COLORECTAL CANCER SCREENING  07/24/2023    COVID-19 Vaccine (5 - 2023-24 season) 09/01/2023    ANNUAL WELLNESS VISIT  11/11/2023    URINE MICROALBUMIN  05/19/2024    BMI FOLLOWUP  05/19/2024    HEMOGLOBIN A1C  09/11/2024    LIPID PANEL  03/11/2025    HEPATITIS C SCREENING  Completed    INFLUENZA VACCINE  Completed    Pneumococcal Vaccine 65+  Completed                   Assessment & Plan   CMS Preventative Services Quick Reference  Risk Factors Identified During Encounter  Immunizations Discussed/Encouraged: Tdap, Shingrix, COVID19, and RSV (Respiratory Syncytial Virus)  The above risks/problems have been discussed with the patient.  Follow up actions/plans if indicated are seen below in the Assessment/Plan Section.  Pertinent information has been shared with the patient in the After Visit Summary.    Diagnoses and all orders for this visit:    1. Encounter for Medicare annual wellness exam (Primary) --S/P subsequent [facility] exam done, WNL except for BMI, 36.1  Needed screening includes PSA, CBC, repeat colorectal screening, numerous vaccinations but declines despite recommendations.  See orders  below.  Weight loss needed ---Needs low-carb/low calorie/low cholesterol diet and increase cardio exercise to greater than 150 minutes weekly   -     CBC & Differential    2. Encounter for screening colonoscopy --chooses Cologuard  -     Cologuard - Stool, Per Rectum; Future    3. Encounter for prostate cancer screening  -     PSA Screen    4. Weight gain --new Dx, uncontrolled  Check TSH  Needs low-carb/low calorie/low cholesterol diet and increase cardio exercise to greater than 150 minutes weekly   -     TSH    5. Mixed hyperlipidemia --remains uncontrolled  Given diabetes, goal LDL needs to be less than 70.  Failed statins, failed Zetia due to myalgias, elevated CK.  Again, discussed in detail recommendation for starting a PCSK9 inhibitor.  Agrees.  Stop WelChol  Start Nexletol 180 mg 1 p.o. daily.  Risk and benefits discussed.  Plan to recheck lipids, CMP in 3 months  Needs low-carb/low calorie/low cholesterol diet and increase cardio exercise to greater than 150 minutes weekly   -     TSH  -     Comprehensive Metabolic Panel; Future  -     Lipid Panel With LDL / HDL Ratio; Future    6. Statin intolerance --needs to start Nexletol, anticipate prior authorization  Failed statins due to myalgias and elevated CK    7. Elevated CK --stable, resolved since being off statins and Zetia    8. Essential hypertension --controlled  Much improved since increasing dose of Benicar at last visit    9. Type 2 diabetes mellitus without complication, without long-term current use of insulin --controlled, remains diet controlled  Needs low-carb/low calorie/low cholesterol diet and increase cardio exercise to greater than 150 minutes weekly   -     Hemoglobin A1c; Future    10. Generalized OA --stable  May continue Mobic as needed provided CBC and renal function remain normal    11. Seborrheic keratosis --new Dx.,  On trunk and back  Benign.  Reassurance given.    Other orders  -     Bempedoic Acid 180 MG tablet; Take 1 tablet by  mouth Daily.  Dispense: 30 tablet; Refill: 5      In addition to wellness exam today, seen and treated for separate and identifiable --uncontrolled hyperlipidemia, new Dx weight gain, new Dx SK        Follow Up:   Return in about 3 months (around 6/13/2024) for Recheck, labs prior to appointment.     An After Visit Summary and PPPS were made available to the patient.

## 2024-03-14 LAB
BASOPHILS # BLD AUTO: 0.02 10*3/MM3 (ref 0–0.2)
BASOPHILS NFR BLD AUTO: 0.4 % (ref 0–1.5)
EOSINOPHIL # BLD AUTO: 0.12 10*3/MM3 (ref 0–0.4)
EOSINOPHIL NFR BLD AUTO: 2.6 % (ref 0.3–6.2)
ERYTHROCYTE [DISTWIDTH] IN BLOOD BY AUTOMATED COUNT: 13.2 % (ref 12.3–15.4)
HCT VFR BLD AUTO: 45.6 % (ref 37.5–51)
HGB BLD-MCNC: 15.4 G/DL (ref 13–17.7)
IMM GRANULOCYTES # BLD AUTO: 0.01 10*3/MM3 (ref 0–0.05)
IMM GRANULOCYTES NFR BLD AUTO: 0.2 % (ref 0–0.5)
LYMPHOCYTES # BLD AUTO: 0.84 10*3/MM3 (ref 0.7–3.1)
LYMPHOCYTES NFR BLD AUTO: 18.5 % (ref 19.6–45.3)
MCH RBC QN AUTO: 31.2 PG (ref 26.6–33)
MCHC RBC AUTO-ENTMCNC: 33.8 G/DL (ref 31.5–35.7)
MCV RBC AUTO: 92.3 FL (ref 79–97)
MONOCYTES # BLD AUTO: 0.45 10*3/MM3 (ref 0.1–0.9)
MONOCYTES NFR BLD AUTO: 9.9 % (ref 5–12)
NEUTROPHILS # BLD AUTO: 3.09 10*3/MM3 (ref 1.7–7)
NEUTROPHILS NFR BLD AUTO: 68.4 % (ref 42.7–76)
NRBC BLD AUTO-RTO: 0 /100 WBC (ref 0–0.2)
PLATELET # BLD AUTO: 152 10*3/MM3 (ref 140–450)
PSA SERPL-MCNC: 1.41 NG/ML (ref 0–4)
RBC # BLD AUTO: 4.94 10*6/MM3 (ref 4.14–5.8)
TSH SERPL DL<=0.005 MIU/L-ACNC: 1.55 UIU/ML (ref 0.27–4.2)
WBC # BLD AUTO: 4.53 10*3/MM3 (ref 3.4–10.8)

## 2024-04-01 RX ORDER — AMLODIPINE BESYLATE 10 MG/1
10 TABLET ORAL DAILY
Qty: 90 TABLET | Refills: 1 | Status: SHIPPED | OUTPATIENT
Start: 2024-04-01

## 2024-04-03 ENCOUNTER — TELEPHONE (OUTPATIENT)
Dept: FAMILY MEDICINE CLINIC | Facility: CLINIC | Age: 69
End: 2024-04-03

## 2024-04-03 RX ORDER — COLESEVELAM 180 1/1
1875 TABLET ORAL 2 TIMES DAILY WITH MEALS
Qty: 180 TABLET | Refills: 5 | OUTPATIENT
Start: 2024-04-03

## 2024-04-03 NOTE — TELEPHONE ENCOUNTER
Caller: Simone Kyle    Relationship: Self    Best call back number: [phone]     What medication are you requesting: colesevelam (Welchol) 625 MG tablet     If a prescription is needed, what is your preferred pharmacy and phone number: [facility]     Additional notes: PATIENT STATED THE ALTERNATIVE MEDICATION PRESCRIBED INSTEAD OF THIS MEDICATION IS NOT COVERED BY HIS INSURANCE, AND WOULD  DOLLARS TO .    PATIENT IS REQUESTING THIS MEDICATION BE PRESCRIBED AGAIN INSTEAD.    PATIENT STATED HE HAS ABOUT A WEEK REMAINING OF THIS MEDICATION.

## 2024-04-15 ENCOUNTER — TELEPHONE (OUTPATIENT)
Dept: FAMILY MEDICINE CLINIC | Facility: CLINIC | Age: 69
End: 2024-04-15
Payer: MEDICARE

## 2024-04-15 NOTE — TELEPHONE ENCOUNTER
----- Message from Janet Holley MA sent at 4/15/2024  2:54 PM EDT -----  Regarding: FW: meds  Contact: [phone]    ----- Message -----  From: Simone Kyle  Sent: 4/15/2024   2:50 PM EDT  [facility]  Subject: meds                                             I cannot afford the new medication that was prescribed and now the pharmacy would not refill  the colesevelam 625 MG tablets that I was taking and I am now out so could you please represcribe it so I can continue taking them until we get something done on my next visit

## 2024-04-16 RX ORDER — COLESEVELAM 180 1/1
1875 TABLET ORAL 2 TIMES DAILY WITH MEALS
Qty: 540 TABLET | Refills: 1 | Status: SHIPPED | OUTPATIENT
Start: 2024-04-16

## 2024-04-22 RX ORDER — OLMESARTAN MEDOXOMIL AND HYDROCHLOROTHIAZIDE 40/25 40; 25 MG/1; MG/1
1 TABLET ORAL DAILY
Qty: 90 TABLET | Refills: 1 | Status: SHIPPED | OUTPATIENT
Start: 2024-04-22

## 2024-05-07 RX ORDER — MELOXICAM 15 MG/1
15 TABLET ORAL DAILY
Qty: 30 TABLET | Refills: 2 | Status: SHIPPED | OUTPATIENT
Start: 2024-05-07

## 2024-06-17 ENCOUNTER — LAB (OUTPATIENT)
Dept: LAB | Facility: HOSPITAL | Age: 69
End: 2024-06-17
Payer: MEDICARE

## 2024-06-17 PROCEDURE — 80053 COMPREHEN METABOLIC PANEL: CPT | Performed by: FAMILY MEDICINE

## 2024-06-17 PROCEDURE — 80061 LIPID PANEL: CPT | Performed by: FAMILY MEDICINE

## 2024-06-17 PROCEDURE — 83036 HEMOGLOBIN GLYCOSYLATED A1C: CPT | Performed by: FAMILY MEDICINE

## 2024-06-19 ENCOUNTER — OFFICE VISIT (OUTPATIENT)
Dept: FAMILY MEDICINE CLINIC | Facility: CLINIC | Age: 69
End: 2024-06-19
Payer: MEDICARE

## 2024-06-19 VITALS
SYSTOLIC BLOOD PRESSURE: 128 MMHG | DIASTOLIC BLOOD PRESSURE: 84 MMHG | HEART RATE: 76 BPM | WEIGHT: 274.2 LBS | TEMPERATURE: 97.5 F | BODY MASS INDEX: 36.34 KG/M2 | OXYGEN SATURATION: 99 % | HEIGHT: 73 IN

## 2024-06-19 DIAGNOSIS — E66.3 OVERWEIGHT: ICD-10-CM

## 2024-06-19 DIAGNOSIS — I10 ESSENTIAL HYPERTENSION: Primary | ICD-10-CM

## 2024-06-19 DIAGNOSIS — E78.2 MIXED HYPERLIPIDEMIA: ICD-10-CM

## 2024-06-19 DIAGNOSIS — E11.9 TYPE 2 DIABETES MELLITUS WITHOUT COMPLICATION, WITHOUT LONG-TERM CURRENT USE OF INSULIN: ICD-10-CM

## 2024-06-19 DIAGNOSIS — M15.9 GENERALIZED OA: ICD-10-CM

## 2024-06-19 PROCEDURE — 3044F HG A1C LEVEL LT 7.0%: CPT | Performed by: FAMILY MEDICINE

## 2024-06-19 PROCEDURE — 1159F MED LIST DOCD IN RCRD: CPT | Performed by: FAMILY MEDICINE

## 2024-06-19 PROCEDURE — 1160F RVW MEDS BY RX/DR IN RCRD: CPT | Performed by: FAMILY MEDICINE

## 2024-06-19 PROCEDURE — 1126F AMNT PAIN NOTED NONE PRSNT: CPT | Performed by: FAMILY MEDICINE

## 2024-06-19 PROCEDURE — 99214 OFFICE O/P EST MOD 30 MIN: CPT | Performed by: FAMILY MEDICINE

## 2024-06-19 PROCEDURE — 3079F DIAST BP 80-89 MM HG: CPT | Performed by: FAMILY MEDICINE

## 2024-06-19 PROCEDURE — 3074F SYST BP LT 130 MM HG: CPT | Performed by: FAMILY MEDICINE

## 2024-06-19 RX ORDER — METOPROLOL SUCCINATE 50 MG/1
50 TABLET, EXTENDED RELEASE ORAL DAILY
Qty: 30 TABLET | Refills: 5 | Status: CANCELLED
Start: 2024-06-19

## 2024-06-19 RX ORDER — MELOXICAM 15 MG/1
15 TABLET ORAL DAILY
Qty: 30 TABLET | Refills: 2 | Status: SHIPPED | OUTPATIENT
Start: 2024-06-19

## 2024-06-19 NOTE — PROGRESS NOTES
Chief Complaint  Hyperlipidemia (Check up follow up labs) and Hypertension    3-month follow-up on hyperlipidemia, HTN, DM, and OA    Last visit with me in March 2024 for Medicare wellness, chronic med refills with labs, uncontrolled lipids and weight gain.  -- Screening studies updated, including PSA and Cologuard, WNL  -- TSH rechecked due to weight gain, WNL  -- Continued with uncontrolled lipids, recommendations were to start Nexletol (prior authorization pain, history of elevated CPK intolerant to statins and Zetia in past.)  Numerous conversations done in past on recent visits regarding starting a PCSK9 inhibitor.    Unfortunately, never did start the Nexletol as requested.  See numerous patient messages on telephone encounters reviewed.  Patient Message with Mychart, Generic Provider (04/16/2024) --requested to restart Welchol due to Nexletol costing over $400  Patient Message with Mychart, Generic Provider (04/04/2024) --- unable to afford Nexletol, message sent to him to please check with his formulary/insurance/pharmacist to see if another medication in this family would be covered such as Praluent or Repatha?    He did restart WelChol approximately 6 weeks ago (had been off x 1 month.)  However, never received the message about checking with his insurance plan or pharmacist for coverage of other medication in the PCSK9 inhibitor family.    Doing fine.  Still working part-time at [employer].    Still tries to maintain a low-cholesterol diet and regular cardio exercise, but admits he could do better.  No further weight gain since last visit, but has gained about 5 to 10 pounds in the last year.   Mood is good.  Sleeping fine.  No chest pain, SOA, or edema.    Myalgias have essentially resolved since discontinuation of the statins.      No new complaints or concerns today.  Needs chronic med refills.  Just had repeat fasting lab work done last week, here for review.  Compliant with and tolerates  "current medications without side effects.  Intolerant to statins and Zetia, elevated CK and myalgias.  Only takes Mobic as needed for general OA.      Review of Systems   Constitutional:  Negative for fever and unexpected weight change.   Respiratory:  Negative for cough and shortness of breath.    Cardiovascular:  Negative for chest pain.        Subjective          Simone Kyle presents to [facility]    Objective   Vital Signs:   Vitals:    06/19/24 0911   BP: 128/84   BP Location: Left arm   Patient Position: Sitting   Cuff Size: Adult   Pulse: 76   Temp: 97.5 °F (36.4 °C)   SpO2: 99%   Weight: 124 kg (274 lb 3.2 oz)   Height: 185.4 cm (73\")      Body mass index is 36.18 kg/m².   Physical Exam  Vitals and nursing note reviewed.   Constitutional:       Appearance: Normal appearance. He is well-developed and overweight.   HENT:      Head: Normocephalic and atraumatic.      Nose: Nose normal.   Eyes:      Conjunctiva/sclera: Conjunctivae normal.      Pupils: Pupils are equal, round, and reactive to light.   Neck:      Thyroid: No thyromegaly.   Cardiovascular:      Rate and Rhythm: Normal rate and regular rhythm.      Heart sounds: Normal heart sounds. No murmur heard.  Pulmonary:      Effort: Pulmonary effort is normal. No respiratory distress.      Breath sounds: Normal breath sounds. No wheezing or rales.   Abdominal:      General: Abdomen is flat. Bowel sounds are normal. There is no distension.      Palpations: Abdomen is soft. There is no hepatomegaly, splenomegaly or mass.      Tenderness: There is no abdominal tenderness. There is no guarding or rebound.      Hernia: No hernia is present.   Musculoskeletal:         General: Normal range of motion.      Cervical back: Normal range of motion and neck supple.      Right lower leg: No edema.      Left lower leg: No edema.   Lymphadenopathy:      Cervical: No cervical adenopathy.   Skin:     General: Skin is warm.   Neurological:      " General: No focal deficit present.      Mental Status: He is alert.   Psychiatric:         Mood and Affect: Mood normal.         Behavior: Behavior normal.         Thought Content: Thought content normal.         Judgment: Judgment normal.      Result Review :     Common labs          3/11/2024    06:41 3/13/2024    09:21 6/17/2024    06:12   Common Labs   Glucose 116   111    BUN 13   18    Creatinine 0.86   0.90    Sodium 140   144    Potassium 3.9   4.5    Chloride 104   109    Calcium 8.9   9.0    Albumin 4.2   4.3    Total Bilirubin 0.3   0.2    Alkaline Phosphatase 61   64    AST (SGOT) 22   22    ALT (SGPT) 27   35    WBC  4.53     Hemoglobin  15.4     Hematocrit  45.6     Platelets  152     Total Cholesterol 226   250    Triglycerides 177   280    HDL Cholesterol 51   48    LDL Cholesterol  143   151    Hemoglobin A1C 6.00   5.90    PSA  1.410       Lipid Panel          8/22/2023    06:29 3/11/2024    06:41 6/17/2024    06:12   Lipid Panel   Total Cholesterol 228  226  250    Triglycerides 141  177  280    HDL Cholesterol 45  51  48    VLDL Cholesterol 26  32  51    LDL Cholesterol  157  143  151    LDL/HDL Ratio 3.44  2.74  3.04      TSH          3/13/2024    09:21   TSH   TSH 1.550      A1C Last 3 Results          8/22/2023    06:29 3/11/2024    06:41 6/17/2024    06:12   HGBA1C Last 3 Results   Hemoglobin A1C 5.70  6.00  5.90            Lab Results   Component Value Date    ANADIRECT Positive (A) 08/28/2020                   Assessment and Plan    Diagnoses and all orders for this visit:    1. Essential hypertension (Primary) --slightly uncontrolled  Would like to consider adding low-dose beta-blocker, however declines at this time.  Strongly recommended weight loss.  Start checking ambulatory blood pressures.  Plan to continue Norvasc 10 mg daily and Benicar HCT 40/25 mg daily  -     Basic Metabolic Panel; Future    2. Mixed hyperlipidemia --remains uncontrolled  See above HPI.  Intolerant to statins and  [place].  Have been trying to get coverage for a PCSK9 inhibitor for some time now.  Nexletol prescribed at last visit, cost prohibitive.  Would like for him to check with his insurance, pharmacist to see if Repatha or Praluent would be more cost affordable?  Also check with GoodRx?  For now, plan to continue WelChol as prescribed.  Really needs to do better with healthier diet and lifestyle, weight loss needed  Needs low-carb/low calorie/low cholesterol diet and increase cardio exercise to greater than 150 minutes weekly   -     Lipid Panel With LDL / HDL Ratio; Future    3. Type 2 diabetes mellitus without complication, without long-term current use of insulin --diet controlled, A1c 5.9  Consider GLP-1, declined.  -     Microalbumin / Creatinine Urine Ratio - Urine, Clean Catch  -     Hemoglobin A1c; Future    4. Generalized OA --controlled  May continue Mobic 15 mg PRN.  CBC and renal function WNL  -     Basic Metabolic Panel; Future    5. Overweight --remains uncontrolled, BMI 36.18  Again, stressed the importance of needed weight loss --- discussed possibly starting a GLP-1, although declines  Needs low-carb/low calorie/low cholesterol diet and increase cardio exercise to greater than 150 minutes weekly     Other orders  -     meloxicam (MOBIC) 15 MG tablet; Take 1 tablet by mouth Daily.  Dispense: 30 tablet; Refill: 2        Follow Up   Return in about 3 months (around 9/19/2024) for Recheck, labs prior to follow-up.  Patient was given instructions and counseling regarding his condition or for health maintenance advice. Please see specific information pulled into the AVS if appropriate.

## 2024-06-19 NOTE — PATIENT INSTRUCTIONS
Check with insurance for coverage of Nexletol, Repatha, or Praluent??  Speak with the pharmacist may call back if 1 of these medicines is cost affordable, okay to do a prior authorization.  Look at GoodRx program?    Weight loss needed ---Recommend low fat/low calorie diet and exercise greater than 150 minutes of cardio per week.      Continue current treatment plan.     Needs to start monitoring home blood pressures, please check and write numbers down and bring to next visit in 3 months

## 2024-06-20 PROBLEM — E66.3 OVERWEIGHT: Status: ACTIVE | Noted: 2024-06-20

## 2024-06-20 LAB
ALBUMIN/CREAT UR: 4 MG/G CREAT (ref 0–29)
CREAT UR-MCNC: 103.5 MG/DL
MICROALBUMIN UR-MCNC: 4.4 UG/ML

## 2024-06-24 ENCOUNTER — TELEPHONE (OUTPATIENT)
Dept: FAMILY MEDICINE CLINIC | Facility: CLINIC | Age: 69
End: 2024-06-24
Payer: MEDICARE

## 2024-06-24 NOTE — TELEPHONE ENCOUNTER
I checked with the pharmacist about Nexletol, Repatha  and Praluent about  overage and affordability and she that Td with a program like Good RX that they would be to expensive for  me to afford

## 2024-09-06 RX ORDER — AMLODIPINE BESYLATE 10 MG/1
10 TABLET ORAL DAILY
Qty: 90 TABLET | Refills: 1 | Status: SHIPPED | OUTPATIENT
Start: 2024-09-06

## 2024-10-22 NOTE — TELEPHONE ENCOUNTER
Rx Refill Note  Requested Prescriptions     Pending Prescriptions Disp Refills    olmesartan-hydrochlorothiazide (Benicar HCT) 40-25 MG per tablet 90 tablet 1     Sig: Take 1 tablet by mouth Daily.      Last office visit with prescribing clinician: 6/19/2024   Last telemedicine visit with prescribing clinician: Visit date not found   Next office visit with prescribing clinician: Visit date not found                         Would you like a call back once the refill request has been completed: [] Yes [] No    If the office needs to give you a call back, can they leave a voicemail: [] Yes [] No    Jessica Newberry MA  10/22/24, 07:33 EDT

## 2024-10-23 RX ORDER — OLMESARTAN MEDOXOMIL AND HYDROCHLOROTHIAZIDE 40/25 40; 25 MG/1; MG/1
1 TABLET ORAL DAILY
Qty: 30 TABLET | Refills: 1 | Status: SHIPPED | OUTPATIENT
Start: 2024-10-23

## 2024-12-03 ENCOUNTER — TELEPHONE (OUTPATIENT)
Dept: FAMILY MEDICINE CLINIC | Facility: CLINIC | Age: 69
End: 2024-12-03
Payer: MEDICARE

## 2024-12-03 NOTE — TELEPHONE ENCOUNTER
Name: Simone Kyle      Relationship: Self      Best Callback Number:     [phone]         HUB PROVIDED THE RELAY MESSAGE FROM THE OFFICE      PATIENT: SCHEDULED PER NOTE    ADDITIONAL INFORMATION: PATIENT WILL BE GETTING LABS AT THE \[facility]\""

## 2024-12-03 NOTE — TELEPHONE ENCOUNTER
HUB OKAY TO RELAY     Left VM to schedule overdue appt w PCP and fasting labs prior - orders active   Return in about 3 months (around 9/19/2024) for Recheck, labs prior to follow-up.

## 2024-12-20 DIAGNOSIS — E78.2 MIXED HYPERLIPIDEMIA: Primary | ICD-10-CM

## 2024-12-20 DIAGNOSIS — I10 ESSENTIAL HYPERTENSION: ICD-10-CM

## 2024-12-20 NOTE — TELEPHONE ENCOUNTER
Rx Refill Note  Requested Prescriptions     Pending Prescriptions Disp Refills    olmesartan-hydrochlorothiazide (Benicar HCT) 40-25 MG per tablet 30 tablet 1     Sig: Take 1 tablet by mouth Daily.      Last office visit with prescribing clinician: 6/19/2024   Last telemedicine visit with prescribing clinician: Visit date not found   Next office visit with prescribing clinician: Visit date not found                         Would you like a call back once the refill request has been completed: [] Yes [] No    If the office needs to give you a call back, can they leave a voicemail: [] Yes [] No    Kathe Harvey CMA/LMR  12/20/24, 12:36 EST

## 2024-12-23 RX ORDER — OLMESARTAN MEDOXOMIL AND HYDROCHLOROTHIAZIDE 40/25 40; 25 MG/1; MG/1
1 TABLET ORAL DAILY
Qty: 30 TABLET | Refills: 0 | Status: SHIPPED | OUTPATIENT
Start: 2024-12-23

## 2024-12-23 NOTE — TELEPHONE ENCOUNTER
"Relay     \"RX filled for temporary 30 day supply, Pt is ouverdue for appt w PCP. Please schedule an appt w \"                "

## 2025-01-20 ENCOUNTER — LAB (OUTPATIENT)
Dept: LAB | Facility: HOSPITAL | Age: 70
End: 2025-01-20
Payer: MEDICARE

## 2025-01-20 DIAGNOSIS — I10 ESSENTIAL HYPERTENSION: ICD-10-CM

## 2025-01-20 PROCEDURE — 80061 LIPID PANEL: CPT | Performed by: FAMILY MEDICINE

## 2025-01-20 PROCEDURE — 80048 BASIC METABOLIC PNL TOTAL CA: CPT | Performed by: FAMILY MEDICINE

## 2025-01-20 PROCEDURE — 83036 HEMOGLOBIN GLYCOSYLATED A1C: CPT | Performed by: FAMILY MEDICINE

## 2025-01-20 RX ORDER — OLMESARTAN MEDOXOMIL AND HYDROCHLOROTHIAZIDE 40/25 40; 25 MG/1; MG/1
1 TABLET ORAL DAILY
Qty: 30 TABLET | Refills: 0 | Status: SHIPPED | OUTPATIENT
Start: 2025-01-20 | End: 2025-01-24 | Stop reason: SDUPTHER

## 2025-01-24 ENCOUNTER — OFFICE VISIT (OUTPATIENT)
Dept: FAMILY MEDICINE CLINIC | Facility: CLINIC | Age: 70
End: 2025-01-24
Payer: MEDICARE

## 2025-01-24 VITALS
TEMPERATURE: 97.6 F | SYSTOLIC BLOOD PRESSURE: 130 MMHG | OXYGEN SATURATION: 97 % | BODY MASS INDEX: 36.45 KG/M2 | DIASTOLIC BLOOD PRESSURE: 62 MMHG | HEART RATE: 62 BPM | WEIGHT: 275 LBS | HEIGHT: 73 IN

## 2025-01-24 DIAGNOSIS — E11.9 TYPE 2 DIABETES MELLITUS WITHOUT COMPLICATION, WITHOUT LONG-TERM CURRENT USE OF INSULIN: ICD-10-CM

## 2025-01-24 DIAGNOSIS — I10 ESSENTIAL HYPERTENSION: Primary | ICD-10-CM

## 2025-01-24 DIAGNOSIS — Z78.9 MEDICALLY COMPLEX PATIENT: ICD-10-CM

## 2025-01-24 DIAGNOSIS — M15.9 GENERALIZED OA: ICD-10-CM

## 2025-01-24 DIAGNOSIS — E78.2 MIXED HYPERLIPIDEMIA: ICD-10-CM

## 2025-01-24 DIAGNOSIS — E66.812 OBESITY, CLASS II, BMI 35-39.9: ICD-10-CM

## 2025-01-24 PROCEDURE — G2211 COMPLEX E/M VISIT ADD ON: HCPCS | Performed by: FAMILY MEDICINE

## 2025-01-24 PROCEDURE — 3078F DIAST BP <80 MM HG: CPT | Performed by: FAMILY MEDICINE

## 2025-01-24 PROCEDURE — 1159F MED LIST DOCD IN RCRD: CPT | Performed by: FAMILY MEDICINE

## 2025-01-24 PROCEDURE — 1126F AMNT PAIN NOTED NONE PRSNT: CPT | Performed by: FAMILY MEDICINE

## 2025-01-24 PROCEDURE — 3044F HG A1C LEVEL LT 7.0%: CPT | Performed by: FAMILY MEDICINE

## 2025-01-24 PROCEDURE — 99214 OFFICE O/P EST MOD 30 MIN: CPT | Performed by: FAMILY MEDICINE

## 2025-01-24 PROCEDURE — 3075F SYST BP GE 130 - 139MM HG: CPT | Performed by: FAMILY MEDICINE

## 2025-01-24 PROCEDURE — 1160F RVW MEDS BY RX/DR IN RCRD: CPT | Performed by: FAMILY MEDICINE

## 2025-01-24 RX ORDER — COLESEVELAM 180 1/1
1875 TABLET ORAL 2 TIMES DAILY WITH MEALS
Qty: 540 TABLET | Refills: 1 | Status: SHIPPED | OUTPATIENT
Start: 2025-01-24

## 2025-01-24 RX ORDER — AMLODIPINE BESYLATE 10 MG/1
10 TABLET ORAL DAILY
Qty: 90 TABLET | Refills: 1 | Status: SHIPPED | OUTPATIENT
Start: 2025-01-24

## 2025-01-24 RX ORDER — MELOXICAM 15 MG/1
15 TABLET ORAL DAILY
Qty: 30 TABLET | Refills: 2 | Status: SHIPPED | OUTPATIENT
Start: 2025-01-24

## 2025-01-24 RX ORDER — OLMESARTAN MEDOXOMIL AND HYDROCHLOROTHIAZIDE 40/25 40; 25 MG/1; MG/1
1 TABLET ORAL DAILY
Qty: 90 TABLET | Refills: 1 | Status: SHIPPED | OUTPATIENT
Start: 2025-01-24

## 2025-01-24 NOTE — ASSESSMENT & PLAN NOTE
Recommendations are for a PCSK9 inhibitor given statin intolerance and elevated CK with significant myalgias; however, patient has declined on multiple visits  Remains intolerant to statins and Zetia.  PCSK9 inhibitors cost prohibited, multiple attempts to get this approved or covered were unsuccessful.

## 2025-01-24 NOTE — PROGRESS NOTES
"Chief Complaint  Hypertension (Check up follow up labs), Hyperlipidemia, and Osteoarthritis    6-month follow-up on HTN, hyperlipidemia, diabetes, OA    Last visit with me in June 2024 for chronic med refills with labs  -- A1c 5.9, remained diet controlled diabetes  -- LDL cholesterol remained uncontrolled however intolerant to statins and Zetia.  Once again tried to prescribe a PSK 9 inhibitor.  Discussed patient assistance, prior authorization but unsuccessful.  -- Discussed adding a beta-blocker for better blood pressure control, but declined      Continues to do well.  No recent ER visits or urgent care visits.  Sees no specialist.  Still working part-time at [employer].    Still tries to maintain a low-cholesterol diet and regular cardio exercise, but admits he could do better.  Recent holidays and wintertime.  No further weight gain since last visit, but has gained about 5 to 10 pounds in the last year.   Mood is good.  Sleeping fine.  No chest pain, SOA, or edema.    Myalgias have essentially resolved since discontinuation of the statins.      No new complaints or concerns today.  Needs chronic med refills.  Just had repeat fasting lab work done last week, here for review.  Compliant with and tolerates current medications without side effects.  Intolerant to statins and Zetia, elevated CK and myalgias.  Only takes Mobic as needed for general OA.         Review of Systems   Constitutional:  Negative for fever and unexpected weight change.   Respiratory:  Negative for cough and shortness of breath.    Cardiovascular:  Negative for chest pain.        Subjective          Simone Kyle presents to [facility]    Objective   Vital Signs:   Vitals:    01/24/25 1239   BP: 130/62   BP Location: Left arm   Patient Position: Sitting   Cuff Size: Adult   Pulse: 62   Temp: 97.6 °F (36.4 °C)   SpO2: 97%   Weight: 125 kg (275 lb)   Height: 185.4 cm (73\")      Body mass index is 36.28 kg/m². "   Physical Exam  Vitals and nursing note reviewed.   Constitutional:       Appearance: Normal appearance. He is well-developed. He is obese.   HENT:      Head: Normocephalic and atraumatic.      Nose: Nose normal.   Eyes:      Conjunctiva/sclera: Conjunctivae normal.      Pupils: Pupils are equal, round, and reactive to light.   Neck:      Thyroid: No thyromegaly.   Cardiovascular:      Rate and Rhythm: Normal rate and regular rhythm.      Heart sounds: Normal heart sounds. No murmur heard.  Pulmonary:      Effort: Pulmonary effort is normal. No respiratory distress.      Breath sounds: Normal breath sounds. No wheezing or rales.   Abdominal:      General: Abdomen is flat. Bowel sounds are normal. There is no distension.      Palpations: Abdomen is soft. There is no hepatomegaly, splenomegaly or mass.      Tenderness: There is no abdominal tenderness. There is no guarding or rebound.      Hernia: No hernia is present.   Musculoskeletal:         General: Normal range of motion.      Cervical back: Normal range of motion and neck supple.      Right lower leg: No edema.      Left lower leg: No edema.   Lymphadenopathy:      Cervical: No cervical adenopathy.   Skin:     General: Skin is warm.   Neurological:      General: No focal deficit present.      Mental Status: He is alert.   Psychiatric:         Mood and Affect: Mood normal.         Behavior: Behavior normal.         Thought Content: Thought content normal.         Judgment: Judgment normal.        Result Review :     Common labs          6/17/2024    06:12 6/19/2024    10:20 1/20/2025    06:19   Common Labs   Glucose 111   121    BUN 18   18    Creatinine 0.90   0.96    Sodium 144   140    Potassium 4.5   4.4    Chloride 109   103    Calcium 9.0   9.0    Albumin 4.3      Total Bilirubin 0.2      Alkaline Phosphatase 64      AST (SGOT) 22      ALT (SGPT) 35      Total Cholesterol 250   240    Triglycerides 280   271    HDL Cholesterol 48   46    LDL Cholesterol   151   145    Hemoglobin A1C 5.90   6.20    Microalbumin, Urine  4.4       Lipid Panel          3/11/2024    06:41 6/17/2024    06:12 1/20/2025    06:19   Lipid Panel   Total Cholesterol 226  250  240    Triglycerides 177  280  271    HDL Cholesterol 51  48  46    VLDL Cholesterol 32  51  49    LDL Cholesterol  143  151  145    LDL/HDL Ratio 2.74  3.04  3.04      TSH          3/13/2024    09:21   TSH   TSH 1.550            Lab Results   Component Value Date    ANADIRECT Positive (A) 08/28/2020                   Assessment and Plan    Diagnoses and all orders for this visit:    1. Essential hypertension (Primary) --controlled  Fair control of BP.  No change with medication at this time.  Continue Benicar as prescribed.  Really does need weight loss --Needs low-carb/low calorie/low cholesterol diet and increase cardio exercise to greater than 150 minutes weekly  -     olmesartan-hydrochlorothiazide (Benicar HCT) 40-25 MG per tablet; Take 1 tablet by mouth Daily.  Dispense: 90 tablet; Refill: 1    2. Mixed hyperlipidemia  Assessment & Plan:     Remains uncontrolled  Failed statins, Zetia due to elevated CK and myalgias  PSK 9 inhibitors are cost prohibited, failed multiple attempts with PAs and patient assistance.  Tolerates WelChol, plan to continue as prescribed.  Really needs to improve upon needed weight loss and healthier diet --Needs low-carb/low calorie/low cholesterol diet and increase cardio exercise to greater than 150 minutes weekly  Continue to monitor with labs every 6 months      3. Type 2 diabetes mellitus without complication, without long-term current use of insulin  Assessment & Plan:    Remains diet controlled  However recent A1c is getting worse, 6.2  Jardiance and GLP-1's are cost prohibited  Really needs to do better with needed weight loss --Needs low-carb/low calorie/low cholesterol diet and increase cardio exercise to greater than 150 minutes weekly  Nearing treatment for diabetes with  medication  Continue to monitor A1c every 6 months      4. Generalized OA --controlled  May continue Mobic 15 mg 1 p.o. daily PRN,   CBC and renal function remain normal    5. Obesity, Class II, BMI 35-39.9 --remains uncontrolled, BMI 36.2  Once again, stressed importance of needed weight loss  Needs low-carb/low calorie/low cholesterol diet and increase cardio exercise to greater than 150 minutes weekly    6. Medically complex patient -- See all the above active chronic diagnoses that require close monitoring and longitudinal care.    Other orders  -     amLODIPine (NORVASC) 10 MG tablet; Take 1 tablet by mouth Daily.  Dispense: 90 tablet; Refill: 1  -     colesevelam (WELCHOL) 625 MG tablet; Take 3 tablets by mouth 2 (Two) Times a Day With Meals.  Dispense: 540 tablet; Refill: 1  -     meloxicam (MOBIC) 15 MG tablet; Take 1 tablet by mouth Daily.  Dispense: 30 tablet; Refill: 2        Follow Up   Return in about 6 months (around 7/24/2025) for Medicare Wellness, Recheck.  Patient was given instructions and counseling regarding his condition or for health maintenance advice. Please see specific information pulled into the AVS if appropriate.

## 2025-01-24 NOTE — PATIENT INSTRUCTIONS
Needs low-carb/low calorie/low cholesterol diet and increase cardio exercise to greater than 150 minutes weekly     Needs to get back on track with a healthier lifestyle ----    Plan to continue medications as prescribed    If able to get coverage for one of the newer cholesterol medications, then may call back for prescription.

## 2025-01-25 PROBLEM — M25.552 PAIN OF LEFT HIP: Status: RESOLVED | Noted: 2023-05-19 | Resolved: 2025-01-25

## 2025-01-25 PROBLEM — E66.3 OVERWEIGHT: Status: RESOLVED | Noted: 2024-06-20 | Resolved: 2025-01-25

## 2025-01-25 PROBLEM — E66.812 OBESITY, CLASS II, BMI 35-39.9: Status: ACTIVE | Noted: 2025-01-25

## 2025-01-25 NOTE — ASSESSMENT & PLAN NOTE
Remains uncontrolled  Failed statins, Zetia due to elevated CK and myalgias  PSK 9 inhibitors are cost prohibited, failed multiple attempts with PAs and patient assistance.  Tolerates WelChol, plan to continue as prescribed.  Really needs to improve upon needed weight loss and healthier diet --Needs low-carb/low calorie/low cholesterol diet and increase cardio exercise to greater than 150 minutes weekly  Continue to monitor with labs every 6 months

## 2025-01-25 NOTE — ASSESSMENT & PLAN NOTE
Remains diet controlled  Jardiance and GLP-1's are cost prohibited  Really needs to do better with needed weight loss --Needs low-carb/low calorie/low cholesterol diet and increase cardio exercise to greater than 150 minutes weekly  Nearing treatment for diabetes with medication  Continue to monitor A1c every 6 months

## 2025-07-29 ENCOUNTER — TELEPHONE (OUTPATIENT)
Dept: FAMILY MEDICINE CLINIC | Facility: CLINIC | Age: 70
End: 2025-07-29

## 2025-07-29 DIAGNOSIS — Z78.9 STATIN INTOLERANCE: ICD-10-CM

## 2025-07-29 DIAGNOSIS — I10 ESSENTIAL HYPERTENSION: Primary | ICD-10-CM

## 2025-07-29 DIAGNOSIS — E11.9 TYPE 2 DIABETES MELLITUS WITHOUT COMPLICATION, WITHOUT LONG-TERM CURRENT USE OF INSULIN: ICD-10-CM

## 2025-07-29 DIAGNOSIS — I25.10 CORONARY ARTERY CALCIFICATION: ICD-10-CM

## 2025-07-29 DIAGNOSIS — E78.2 MIXED HYPERLIPIDEMIA: ICD-10-CM

## 2025-07-29 DIAGNOSIS — Z12.5 ENCOUNTER FOR PROSTATE CANCER SCREENING: ICD-10-CM

## 2025-08-18 DIAGNOSIS — I10 ESSENTIAL HYPERTENSION: ICD-10-CM

## 2025-08-18 RX ORDER — OLMESARTAN MEDOXOMIL AND HYDROCHLOROTHIAZIDE 40/25 40; 25 MG/1; MG/1
1 TABLET ORAL DAILY
Qty: 90 TABLET | Refills: 1 | Status: SHIPPED | OUTPATIENT
Start: 2025-08-18